# Patient Record
Sex: MALE | Race: WHITE | NOT HISPANIC OR LATINO | Employment: STUDENT | URBAN - METROPOLITAN AREA
[De-identification: names, ages, dates, MRNs, and addresses within clinical notes are randomized per-mention and may not be internally consistent; named-entity substitution may affect disease eponyms.]

---
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
## 2019-02-22 ENCOUNTER — OFFICE VISIT (OUTPATIENT)
Dept: PEDIATRICS | Facility: CLINIC | Age: 19
End: 2019-02-22
Payer: COMMERCIAL

## 2019-02-22 VITALS
HEIGHT: 78 IN | TEMPERATURE: 96.6 F | HEART RATE: 92 BPM | DIASTOLIC BLOOD PRESSURE: 80 MMHG | WEIGHT: 239.1 LBS | OXYGEN SATURATION: 100 % | BODY MASS INDEX: 27.66 KG/M2 | SYSTOLIC BLOOD PRESSURE: 120 MMHG

## 2019-02-22 DIAGNOSIS — S90.211A SUBUNGUAL HEMATOMA OF GREAT TOE OF RIGHT FOOT, INITIAL ENCOUNTER: Primary | ICD-10-CM

## 2019-02-22 DIAGNOSIS — L03.031 PARONYCHIA OF TOE, RIGHT: ICD-10-CM

## 2019-02-22 DIAGNOSIS — M79.674 GREAT TOE PAIN, RIGHT: ICD-10-CM

## 2019-02-22 PROCEDURE — 99213 OFFICE O/P EST LOW 20 MIN: CPT | Performed by: NURSE PRACTITIONER

## 2019-02-22 RX ORDER — CEPHALEXIN 500 MG/1
500 CAPSULE ORAL 2 TIMES DAILY
Qty: 14 CAPSULE | Refills: 0 | Status: SHIPPED | OUTPATIENT
Start: 2019-02-22 | End: 2019-03-01

## 2019-02-22 ASSESSMENT — MIFFLIN-ST. JEOR: SCORE: 2224.86

## 2019-02-22 NOTE — PATIENT INSTRUCTIONS
PLAN:   1.   Symptomatic therapy suggested: warm soaks  2.  Orders Placed This Encounter   Medications     cephALEXin (KEFLEX) 500 MG capsule     Sig: Take 1 capsule (500 mg) by mouth 2 times daily for 7 days     Dispense:  14 capsule     Refill:  0     3. Patient needs to follow up in if no improvement,or sooner if worsening of symptoms or other symptoms develop.  Patient Education     Subungual Hematoma    You just slammed the car door on your finger. The pain is nearly unbearable, and your nail has turned black and blue. It's likely you have a subungual hematoma. This is a pool of blood that collects under a nail after an injury. Although a nail hematoma is seldom serious, it can be very painful.  When to call your healthcare provider  Any severe blow to a finger or toe should be checked by your healthcare provider. You may have broken bones (fractures) or damage to other tissues. If you can't see your healthcare provider right away, go to the nearest emergency department.  Here's what you can expect when you see a healthcare provider:    Your nail will be examined.    X-rays may be taken to check for a bone fracture or other injury.    A procedure may be done to drain the blood from the hematoma and relieve pain (trephination). The healthcare provider makes a small hole in the nail using a special preston or drill. The blood under the nail can drain out through this hole. The nail is then bandaged.    If the nail is badly damaged it may need to be removed. Deep cuts beneath the nail can then be repaired with stitches.  Follow-up  If the damaged nail isn't removed, it will most likely fall off on its own. A fingernail can regrow in a few months. Toenails take longer--as long as a year and a half. See your healthcare provider if you have any problems with the nail as it heals and regrows.  Date Last Reviewed: 2/1/2018 2000-2018 [employer]. [address] [place] [address], [place] [address]. All rights  reserved. This information is not intended as a substitute for professional medical care. Always follow your healthcare professional's instructions.           Patient Education     Paronychia of the Finger or Toe  Paronychia is an infection near a fingernail or toenail. It usually occurs when an opening in the cuticle or an ingrown toenail lets bacteria under the skin.  The infection will need to be drained if pus is present. If the infection has been caught early, you may need only antibiotic treatment. Healing will take about 1 to 2 weeks.  Home care  Follow these guidelines when caring for yourself at home:    Clean and soak the toe or finger. Do this 2 times a day for the first 3 days. To do so:  ? Soak your foot or hand in a tub of warm water for 5 minutes. Or hold your toe or finger under a faucet of warm running water for 5 minutes.  ? Clean any crust away with soap and water using a cotton swab.  ? Put antibiotic ointment on the infected area.    Change the dressing daily or any time it gets dirty.    If you were given antibiotics, take them as directed until they are all gone.    If your infection is on a toe, wear comfortable shoes with a lot of toe room. You can also wear open-toed sandals while your toe heals.    You may use over-the-counter medicine (acetaminophen or ibuprofen to help with pain, unless another medicine was prescribed. If you have chronic liver or kidney disease, talk with your healthcare provider before using these medicines. Also talk with your provider if you've had a stomach ulcer or GI (gastrointestinal) bleeding.  Prevention  The following can prevent paronychia:    Avoid cutting or playing with your cuticles at home.    Don't bite your nails.    Don't suck on your thumbs or fingers.  Follow-up care  Follow up with your healthcare provider, or as advised.  When to seek medical advice  Call your healthcare provider right away if any of these occur:    Redness, pain, or swelling of  the finger or toe gets worse    Red streaks in the skin leading away from the wound    Pus or fluid draining from the nail area    Fever of 100.4 F (38 C) or higher, or as directed by your provider  Date Last Reviewed: 8/1/2016 2000-2018 [employer]. [address] [place] [address]. All rights reserved. This information is not intended as a substitute for professional medical care. Always follow your healthcare professional's instructions.

## 2019-02-22 NOTE — PROGRESS NOTES
SUBJECTIVE:   Lamine Cordero is a 18 year old male who presents to clinic today for the following health issues:      Right big toe Pain    Onset: 4-5 days    Description:   Location: right big toe  Character: sore feeling when pushing on nail    Intensity: mild    Progression of Symptoms: better    Accompanying Signs & Symptoms:  Other symptoms: swelling, redness and discoloration of toenail    History:   Previous similar pain: YES      Precipitating factors:   Trauma or overuse: YES- while snowboarding    Alleviating factors:  Improved by: nothing    Therapies Tried and outcome: elevation  Was snowboarding and jammed the toe and stung a little   The was blue under it right away   Was painful and now not so painful but feel like something under the nail     Problem list and histories reviewed & adjusted, as indicated.  Additional history: as documented    Patient Active Problem List   Diagnosis     Plantar warts     Nevus     Closed nondisplaced fracture of base of fifth metacarpal bone of right hand, initial encounter     Closed nondisplaced fracture of base of fourth metacarpal bone of right hand, initial encounter     Past Surgical History:   Procedure Laterality Date     NO HISTORY OF SURGERY         Social History     Tobacco Use     Smoking status: Never Smoker     Smokeless tobacco: Never Used     Tobacco comment: Lives in smoke free household   Substance Use Topics     Alcohol use: No     Family History   Problem Relation Age of Onset     Arthritis Mother      Cancer No family hx of      Diabetes No family hx of      Hypertension No family hx of      Cerebrovascular Disease No family hx of      Thyroid Disease No family hx of      Glaucoma No family hx of      Macular Degeneration No family hx of          No current outpatient medications on file.     No Known Allergies  BP Readings from Last 3 Encounters:   02/22/19 120/80   09/17/18 128/82   03/08/17 103/66    Wt Readings from Last 3 Encounters:  "  02/22/19 108.5 kg (239 lb 1.6 oz) (99 %)*   09/17/18 100.2 kg (221 lb) (97 %)*   03/08/17 88 kg (194 lb) (93 %)*     * Growth percentiles are based on [facility] (Boys, 2-20 Years) data.                    Reviewed and updated as needed this visit by clinical staff  Tobacco  Allergies  Meds  Med Hx  Surg Hx  Fam Hx  Soc Hx      Reviewed and updated as needed this visit by Provider         ROS:  Constitutional, HEENT, cardiovascular, pulmonary, gi and gu systems are negative, except as otherwise noted.    OBJECTIVE:     /80 (BP Location: Right arm, Patient Position: Sitting, Cuff Size: Adult Large)   Pulse 92   Temp 96.6  F (35.9  C) (Temporal)   Ht 1.969 m (6' 5.5\")   Wt 108.5 kg (239 lb 1.6 oz)   SpO2 100%   BMI 27.99 kg/m    Body mass index is 27.99 kg/m .  GENERAL APPEARANCE: alert, active and no distress  RESP: lungs clear to auscultation - no rales, rhonchi or wheezes  CV: regular rates and rhythm  MS: extremities normal- no gross deformities noted and peripheral pulses normal  ORTHO: Examination of the toe great toe shows nail exam subungal hematoma. Fluctuant area under nailfold with mild surrounding erythema. Not tender to palpation of the nail itself   SKIN: Skin color, texture, turgor normal.   PSYCH: mentation appears normal and affect normal/bright    Diagnostic Test Results:  none     ASSESSMENT/PLAN:     Lamine was seen today for musculoskeletal problem.    Diagnoses and all orders for this visit:    Subungual hematoma of great toe of right foot, initial encounter    Great toe pain, right    Paronychia of toe, right  -     cephALEXin (KEFLEX) 500 MG capsule; Take 1 capsule (500 mg) by mouth 2 times daily for 7 days    See Patient Instructions  Patient Instructions     PLAN:   1.   Symptomatic therapy suggested: warm soaks  2.  Orders Placed This Encounter   Medications     cephALEXin (KEFLEX) 500 MG capsule     Sig: Take 1 capsule (500 mg) by mouth 2 times daily for 7 days     Dispense:  14 " capsule     Refill:  0     3. Patient needs to follow up in if no improvement,or sooner if worsening of symptoms or other symptoms develop.  Patient Education     JANINE Alonso CNP  [facility]

## 2019-02-22 NOTE — NURSING NOTE
"Chief Complaint   Patient presents with     Musculoskeletal Problem     big toe on right foot injury snowboarding x 4-5 days        Initial /80 (BP Location: Right arm, Patient Position: Sitting, Cuff Size: Adult Large)   Pulse 92   Temp 96.6  F (35.9  C) (Temporal)   Ht 1.969 m (6' 5.5\")   Wt 108.5 kg (239 lb 1.6 oz)   SpO2 100%   BMI 27.99 kg/m   Estimated body mass index is 27.99 kg/m  as calculated from the following:    Height as of this encounter: 1.969 m (6' 5.5\").    Weight as of this encounter: 108.5 kg (239 lb 1.6 oz).  Medication Reconciliation: complete      CECIL Wen      "

## 2019-08-27 ENCOUNTER — ALLIED HEALTH/NURSE VISIT (OUTPATIENT)
Dept: NURSING | Facility: CLINIC | Age: 19
End: 2019-08-27
Payer: COMMERCIAL

## 2019-08-27 VITALS — TEMPERATURE: 97.1 F | WEIGHT: 250.8 LBS | BODY MASS INDEX: 29.36 KG/M2

## 2019-08-27 DIAGNOSIS — Z23 ENCOUNTER FOR VACCINATION: Primary | ICD-10-CM

## 2019-08-27 PROCEDURE — 90471 IMMUNIZATION ADMIN: CPT

## 2019-08-27 PROCEDURE — 90651 9VHPV VACCINE 2/3 DOSE IM: CPT

## 2019-08-27 PROCEDURE — 99207 ZZC NO CHARGE NURSE ONLY: CPT

## 2019-08-27 NOTE — PROGRESS NOTES
Screening Questionnaire for Adult Immunization    Are you sick today?   No   Do you have allergies to medications, food, a vaccine component or latex?   No   Have you ever had a serious reaction after receiving a vaccination?   No   Do you have a long-term health problem with heart disease, lung disease, asthma, kidney disease, metabolic disease (e.g. diabetes), anemia, or other blood disorder?   No   Do you have cancer, leukemia, HIV/AIDS, or any other immune system problem?   No   In the past 3 months, have you taken medications that affect  your immune system, such as prednisone, other steroids, or anticancer drugs; drugs for the treatment of rheumatoid arthritis, Crohn s disease, or psoriasis; or have you had radiation treatments?   No   Have you had a seizure, or a brain or other nervous system problem?   No   During the past year, have you received a transfusion of blood or blood     products, or been given immune (gamma) globulin or antiviral drug?   No   For women: Are you pregnant or is there a chance you could become        pregnant during the next month?   No   Have you received any vaccinations in the past 4 weeks?   No     Immunization questionnaire answers were all negative.      Gardasil 9/HPV #3 given in left deltoid by Jeanne Gomez. Patient instructed to remain in clinic for 15 minutes afterwards, and to report any adverse reaction to me immediately.       Screening performed by Jeanne Gomez on 8/27/2019 at 2:29 PM.

## 2020-01-14 ENCOUNTER — OFFICE VISIT (OUTPATIENT)
Dept: FAMILY MEDICINE | Facility: CLINIC | Age: 20
End: 2020-01-14
Payer: COMMERCIAL

## 2020-01-14 VITALS
DIASTOLIC BLOOD PRESSURE: 76 MMHG | TEMPERATURE: 98.5 F | HEIGHT: 77 IN | HEART RATE: 97 BPM | OXYGEN SATURATION: 98 % | WEIGHT: 251 LBS | SYSTOLIC BLOOD PRESSURE: 130 MMHG | BODY MASS INDEX: 29.64 KG/M2 | RESPIRATION RATE: 18 BRPM

## 2020-01-14 DIAGNOSIS — J06.9 VIRAL URI WITH COUGH: Primary | ICD-10-CM

## 2020-01-14 DIAGNOSIS — R07.0 THROAT PAIN: ICD-10-CM

## 2020-01-14 LAB
DEPRECATED S PYO AG THROAT QL EIA: NORMAL
SPECIMEN SOURCE: NORMAL

## 2020-01-14 PROCEDURE — 87880 STREP A ASSAY W/OPTIC: CPT | Performed by: FAMILY MEDICINE

## 2020-01-14 PROCEDURE — 87081 CULTURE SCREEN ONLY: CPT | Performed by: FAMILY MEDICINE

## 2020-01-14 PROCEDURE — 99213 OFFICE O/P EST LOW 20 MIN: CPT | Performed by: FAMILY MEDICINE

## 2020-01-14 RX ORDER — BENZONATATE 200 MG/1
200 CAPSULE ORAL 3 TIMES DAILY PRN
Qty: 30 CAPSULE | Refills: 0 | Status: SHIPPED | OUTPATIENT
Start: 2020-01-14 | End: 2022-06-08

## 2020-01-14 ASSESSMENT — MIFFLIN-ST. JEOR: SCORE: 2265.91

## 2020-01-14 NOTE — PATIENT INSTRUCTIONS

## 2020-01-14 NOTE — PROGRESS NOTES
Subjective     Lamine Cordero is a 19 year old male who presents to clinic today for the following health issues:    HPI   Acute Illness   Acute illness concerns: cough, sorethroat  Onset: x4-5 days     Fever: YES    Chills/Sweats: YES- both    Headache (location?): YES    Sinus Pressure:YES    Conjunctivitis:  no    Ear Pain: no    Rhinorrhea: no     Congestion: YES    Sore Throat: YES     Cough: YES    Wheeze: no    Decreased Appetite: no    Nausea: no    Vomiting: no    Diarrhea:  no    Dysuria/Freq.: no    Fatigue/Achiness: YES- body aches    Sick/Strep Exposure: no     Therapies Tried and outcome: aleve        Patient Active Problem List   Diagnosis     Plantar warts     Nevus     Closed nondisplaced fracture of base of fifth metacarpal bone of right hand, initial encounter     Closed nondisplaced fracture of base of fourth metacarpal bone of right hand, initial encounter     Past Surgical History:   Procedure Laterality Date     NO HISTORY OF SURGERY         Social History     Tobacco Use     Smoking status: Never Smoker     Smokeless tobacco: Never Used     Tobacco comment: Lives in smoke free household   Substance Use Topics     Alcohol use: No     Family History   Problem Relation Age of Onset     Arthritis Mother      Cancer No family hx of      Diabetes No family hx of      Hypertension No family hx of      Cerebrovascular Disease No family hx of      Thyroid Disease No family hx of      Glaucoma No family hx of      Macular Degeneration No family hx of          Current Outpatient Medications   Medication Sig Dispense Refill     benzonatate (TESSALON) 200 MG capsule Take 1 capsule (200 mg) by mouth 3 times daily as needed for cough 30 capsule 0     No Known Allergies      Reviewed and updated as needed this visit by Provider         Review of Systems   ROS COMP: Constitutional, HEENT, cardiovascular, pulmonary, gi and gu systems are negative, except as otherwise noted.      Objective    /76   Pulse  "97   Temp 98.5  F (36.9  C) (Tympanic)   Resp 18   Ht 1.956 m (6' 5\")   Wt 113.9 kg (251 lb)   SpO2 98%   BMI 29.76 kg/m    Body mass index is 29.76 kg/m .  Physical Exam   GENERAL: healthy, alert and no distress  EYES: Eyes grossly normal to inspection, PERRL and conjunctivae and sclerae normal  HENT: ear canals and TM's normal, nose and mouth without ulcers or lesions  NECK: no adenopathy, no asymmetry, masses, or scars and thyroid normal to palpation  RESP: lungs clear to auscultation - no rales, rhonchi or wheezes  CV: regular rate and rhythm, normal S1 S2, no S3 or S4, no murmur, click or rub, no peripheral edema and peripheral pulses strong  PSYCH: mentation appears normal, affect normal/bright    Diagnostic Test Results:  Reviewed and discussed with patient prior to discharge.  Results for orders placed or performed in visit on 01/14/20   Strep, Rapid Screen     Status: None   Result Value Ref Range    Specimen Description Throat     Rapid Strep A Screen       NEGATIVE: No Group A streptococcal antigen detected by immunoassay, await culture report.             Assessment & Plan     Lamine was seen today for pharyngitis.    Diagnoses and all orders for this visit:    Viral URI with cough  -     benzonatate (TESSALON) 200 MG capsule; Take 1 capsule (200 mg) by mouth 3 times daily as needed for cough  -     Reassured that this is self limiting.   Recommended supportive management. Increase fluid intake. Plenty of rest.   Tylenol+/-Ibuprofen as needed for discomfort and fever.      Throat pain  -     Strep, Rapid Screen  -     Beta strep group A culture  -     Throat lozenges or sprays (such as Chloraseptic) help reduce pain. Gargling with warm salt water will also reduce throat pain. Dissolve 1/2 teaspoon of salt in 1 glass of warm water. This may be useful just before meals.         Patient education and Handout with home care instructions given. See AVS for details.      Return in about 1 week (around " 1/21/2020), or if symptoms worsen or fail to improve, for a Physical Exam at your earliest convenience..    Blanche Davies MD  [facility]

## 2020-01-15 LAB
BACTERIA SPEC CULT: NORMAL
SPECIMEN SOURCE: NORMAL

## 2022-06-08 ENCOUNTER — OFFICE VISIT (OUTPATIENT)
Dept: FAMILY MEDICINE | Facility: CLINIC | Age: 22
End: 2022-06-08
Payer: COMMERCIAL

## 2022-06-08 VITALS
OXYGEN SATURATION: 99 % | TEMPERATURE: 97.5 F | BODY MASS INDEX: 30.7 KG/M2 | HEART RATE: 89 BPM | WEIGHT: 260 LBS | SYSTOLIC BLOOD PRESSURE: 114 MMHG | RESPIRATION RATE: 12 BRPM | HEIGHT: 77 IN | DIASTOLIC BLOOD PRESSURE: 80 MMHG

## 2022-06-08 DIAGNOSIS — J02.0 STREPTOCOCCAL PHARYNGITIS: Primary | ICD-10-CM

## 2022-06-08 DIAGNOSIS — R07.0 THROAT PAIN: ICD-10-CM

## 2022-06-08 DIAGNOSIS — Z23 NEED FOR TDAP VACCINATION: ICD-10-CM

## 2022-06-08 LAB
DEPRECATED S PYO AG THROAT QL EIA: POSITIVE
FLUAV AG SPEC QL IA: NEGATIVE
FLUBV AG SPEC QL IA: NEGATIVE

## 2022-06-08 PROCEDURE — U0005 INFEC AGEN DETEC AMPLI PROBE: HCPCS | Performed by: NURSE PRACTITIONER

## 2022-06-08 PROCEDURE — 87804 INFLUENZA ASSAY W/OPTIC: CPT | Performed by: NURSE PRACTITIONER

## 2022-06-08 PROCEDURE — 90715 TDAP VACCINE 7 YRS/> IM: CPT | Performed by: NURSE PRACTITIONER

## 2022-06-08 PROCEDURE — 90471 IMMUNIZATION ADMIN: CPT | Performed by: NURSE PRACTITIONER

## 2022-06-08 PROCEDURE — 87880 STREP A ASSAY W/OPTIC: CPT | Performed by: NURSE PRACTITIONER

## 2022-06-08 PROCEDURE — 99214 OFFICE O/P EST MOD 30 MIN: CPT | Mod: CS | Performed by: NURSE PRACTITIONER

## 2022-06-08 PROCEDURE — U0003 INFECTIOUS AGENT DETECTION BY NUCLEIC ACID (DNA OR RNA); SEVERE ACUTE RESPIRATORY SYNDROME CORONAVIRUS 2 (SARS-COV-2) (CORONAVIRUS DISEASE [COVID-19]), AMPLIFIED PROBE TECHNIQUE, MAKING USE OF HIGH THROUGHPUT TECHNOLOGIES AS DESCRIBED BY CMS-2020-01-R: HCPCS | Performed by: NURSE PRACTITIONER

## 2022-06-08 RX ORDER — AMOXICILLIN 500 MG/1
500 CAPSULE ORAL 2 TIMES DAILY
Qty: 20 CAPSULE | Refills: 0 | Status: SHIPPED | OUTPATIENT
Start: 2022-06-08 | End: 2022-06-18

## 2022-06-08 ASSESSMENT — PAIN SCALES - GENERAL: PAINLEVEL: SEVERE PAIN (7)

## 2022-06-08 NOTE — PROGRESS NOTES
"Assessment & Plan   1. Streptococcal pharyngitis: strep antigen positive. Amoxicillin prescribed. Discussed options for pain and inflammation control by alternating tylenol and ibuprofen every 6-8 hours. Provided education on when to go to the ER, such as inability to control oral secretion and SOB/difficulty breathing. Patient is agreeable to plan and understands follow-up.   - amoxicillin (AMOXIL) 500 MG capsule; Take 1 capsule (500 mg) by mouth 2 times daily for 10 days  Dispense: 20 capsule; Refill: 0    2. Throat pain: strep positive  - Streptococcus A Rapid Screen w/Reflex to PCR  - Influenza A & B Antigen  - Symptomatic; Unknown COVID-19 Virus (Coronavirus) by PCR Nose    3. Need for Tdap vaccination: TDAP updated at appointment  - TDAP VACCINE (Adacel, Boostrix)    See Patient Instructions    JANINE Driver CNP  [facility] RASHI Raman is a 21 year old who presents for the following health issues     History of Present Illness       Reason for visit:  Issues with tonsil area in throat  Symptom onset:  Today  Symptom intensity:  Moderate  Symptom progression:  Staying the same  Had these symptoms before:  No    He eats 2-3 servings of fruits and vegetables daily.He consumes 2 sweetened beverage(s) daily.He exercises with enough effort to increase his heart rate 20 to 29 minutes per day.  He exercises with enough effort to increase his heart rate 4 days per week.   He is taking medications regularly.    Review of Systems   CONSTITUTIONAL: NEGATIVE for fever, chills, change in weight  ENT/MOUTH: POSITIVE for sore throat  RESP: NEGATIVE for significant cough or SOB  CV: NEGATIVE for chest pain, palpitations or peripheral edema  ROS otherwise negative      Objective    /80 (BP Location: Left arm, Patient Position: Sitting, Cuff Size: Adult Large)   Pulse 89   Temp 97.5  F (36.4  C) (Tympanic)   Resp 12   Ht 1.956 m (6' 5\")   Wt 117.9 kg (260 lb)   SpO2 99%   BMI " 30.83 kg/m      Physical Exam  Constitutional:       General: He is not in acute distress.     Appearance: Normal appearance. He is not ill-appearing.   HENT:      Head: Normocephalic and atraumatic.      Right Ear: Tympanic membrane and ear canal normal.      Left Ear: Tympanic membrane and ear canal normal.      Nose: Congestion present.      Mouth/Throat:      Pharynx: Pharyngeal swelling and posterior oropharyngeal erythema present.      Tonsils: Tonsillar exudate present.   Cardiovascular:      Rate and Rhythm: Normal rate and regular rhythm.      Heart sounds: Normal heart sounds, S1 normal and S2 normal. Heart sounds not distant. No murmur heard.    No friction rub. No gallop. No S3 or S4 sounds.   Pulmonary:      Effort: Pulmonary effort is normal.      Breath sounds: Normal breath sounds. No decreased breath sounds, wheezing, rhonchi or rales.   Musculoskeletal:      Cervical back: Full passive range of motion without pain, normal range of motion and neck supple.   Lymphadenopathy:      Head:      Right side of head: No submental, submandibular, tonsillar or posterior auricular adenopathy.      Left side of head: No submental, submandibular, tonsillar or posterior auricular adenopathy.      Cervical: No cervical adenopathy.   Skin:     General: Skin is warm and dry.      Capillary Refill: Capillary refill takes 2 to 3 seconds.   Neurological:      Mental Status: He is alert.        Results for orders placed or performed in visit on 06/08/22 (from the past 24 hour(s))   Streptococcus A Rapid Screen w/Reflex to PCR    Specimen: Throat; Swab   Result Value Ref Range    Group A Strep antigen Positive (A) Negative   Influenza A & B Antigen    Specimen: Nose; Swab   Result Value Ref Range    Influenza A antigen Negative Negative    Influenza B antigen Negative Negative    Narrative    Test results must be correlated with clinical data. If necessary, results should be confirmed by a molecular assay or viral  culture.         I agree with the PFSH and ROS as completed by the NP student. The remainder of the encounter was performed by me and scribed the NP student. The scribed note accurately reflects my personal services and the decisions made by me. JANINE Hidalgo, NP-C

## 2022-06-08 NOTE — LETTER
[facility]  [address] [place] [address]  Phone: [phone]    June 8, 2022        Lamine Cordero  [address] [address]      RE: Lamine Cordero    Patient was seen and treated today at our clinic.  Please excuse him from work today, June 8, 2022    Please contact me for questions or concerns.      Sincerely,        JANINE Driver CNP

## 2022-06-09 LAB — SARS-COV-2 RNA RESP QL NAA+PROBE: NEGATIVE

## 2022-06-19 ENCOUNTER — HEALTH MAINTENANCE LETTER (OUTPATIENT)
Age: 22
End: 2022-06-19

## 2022-07-05 NOTE — PROGRESS NOTES
SUBJECTIVE:   CC: Lamine Cordero is an 21 year old woman who presents for preventive health visit.     {Split Bill scripting  The purpose of this visit is to discuss your medical history and prevent health problems before you are sick. You may be responsible for a co-pay, coinsurance, or deductible if your visit today includes services such as checking on a sore throat, having an x-ray or lab test, or treating and evaluating a new or existing condition :236789}  {Patient advised of split billing (Optional):684143}  HPI  {Add if <65 person on Medicare  - Required Questions (Optional):136561}  {Outside tests to abstract? :233943}    {additional problems to add (Optional):627773}    Today's PHQ-2 Score:   PHQ-2 ( 1999 [employer]) 6/8/2022   Q1: Little interest or pleasure in doing things 1   Q2: Feeling down, depressed or hopeless 1   PHQ-2 Score 2   PHQ-2 Total Score (12-17 Years)- Positive if 3 or more points; Administer PHQ-A if positive -   Q1: Little interest or pleasure in doing things Several days   Q2: Feeling down, depressed or hopeless Several days   PHQ-2 Score 2       Abuse: Current or Past (Physical, Sexual or Emotional) - { :656079}  Do you feel safe in your environment? { :890218}    Have you ever done Advance Care Planning? (For example, a Health Directive, POLST, or a discussion with a medical provider or your loved ones about your wishes): { :535538}    Social History     Tobacco Use     Smoking status: Never Smoker     Smokeless tobacco: Never Used     Tobacco comment: Lives in smoke free household   Substance Use Topics     Alcohol use: No     {Rooming Staff- Complete this question if Prescreen response is not shown below for today's visit. If you drink alcohol do you typically have >3 drinks per day or >7 drinks per week? (Optional):958335}    No flowsheet data found.{add AUDIT responses (Optional) (A score of 7 for adult men is an indication of hazardous drinking; a score of 8 or more is an  "indication of an alcohol use disorder.  A score of 7 or more for adult women is an indication of hazardous drinking or an alchohol use disorder):885083}    Reviewed orders with patient.  Reviewed health maintenance and updated orders accordingly - { :395638::\"Yes\"}  {Chronicprobdata (optional):026470}    Breast Cancer Screening:        History of abnormal Pap smear: { :668654}     Reviewed and updated as needed this visit by clinical staff                    Reviewed and updated as needed this visit by Provider                   {HISTORY OPTIONS (Optional):611389}    Review of Systems  {FEMALE ROS (Optional):763570}     OBJECTIVE:   There were no vitals taken for this visit.  Physical Exam  {Exam Choices (Optional):171776}    {Diagnostic Test Results (Optional):104507::\"Diagnostic Test Results:\",\"Labs reviewed in Epic\"}    ASSESSMENT/PLAN:   {Diag Picklist:731820}    {Patient advised of split billing (Optional):124401}    COUNSELING:  {FEMALE COUNSELING MESSAGES:882910::\"Reviewed preventive health counseling, as reflected in patient instructions\"}    Estimated body mass index is 30.83 kg/m  as calculated from the following:    Height as of 6/8/22: 1.956 m (6' 5\").    Weight as of 6/8/22: 117.9 kg (260 lb).    {Weight Management Plan (ACO) Complete if BMI is abnormal-  Ages 18-64  BMI >24.9.  Age 65+ with BMI <23 or >30 (Optional):998304}    He reports that he has never smoked. He has never used smokeless tobacco.      Counseling Resources:  ATP IV Guidelines  Pooled Cohorts Equation Calculator  Breast Cancer Risk Calculator  BRCA-Related Cancer Risk Assessment: FHS-7 Tool  FRAX Risk Assessment  ICSI Preventive Guidelines  Dietary Guidelines for Americans, 2010  USDA's MyPlate  ASA Prophylaxis  Lung CA Screening    JANINE Driver [facility]  "

## 2022-07-06 ENCOUNTER — OFFICE VISIT (OUTPATIENT)
Dept: FAMILY MEDICINE | Facility: CLINIC | Age: 22
End: 2022-07-06
Payer: COMMERCIAL

## 2022-07-06 VITALS
TEMPERATURE: 97.8 F | HEIGHT: 78 IN | BODY MASS INDEX: 32.6 KG/M2 | OXYGEN SATURATION: 100 % | SYSTOLIC BLOOD PRESSURE: 117 MMHG | WEIGHT: 281.8 LBS | DIASTOLIC BLOOD PRESSURE: 79 MMHG | HEART RATE: 77 BPM | RESPIRATION RATE: 20 BRPM

## 2022-07-06 DIAGNOSIS — R09.81 NASAL CONGESTION: ICD-10-CM

## 2022-07-06 DIAGNOSIS — Z11.59 NEED FOR HEPATITIS C SCREENING TEST: ICD-10-CM

## 2022-07-06 DIAGNOSIS — B37.2 CANDIDAL INTERTRIGO: Primary | ICD-10-CM

## 2022-07-06 DIAGNOSIS — F41.9 ANXIETY: ICD-10-CM

## 2022-07-06 DIAGNOSIS — Z00.00 ROUTINE GENERAL MEDICAL EXAMINATION AT A HEALTH CARE FACILITY: ICD-10-CM

## 2022-07-06 LAB
CHOLEST SERPL-MCNC: 155 MG/DL
DEPRECATED CALCIDIOL+CALCIFEROL SERPL-MC: 19 UG/L (ref 20–75)
FASTING STATUS PATIENT QL REPORTED: YES
FASTING STATUS PATIENT QL REPORTED: YES
GLUCOSE BLD-MCNC: 95 MG/DL (ref 70–99)
HCV AB SERPL QL IA: NONREACTIVE
HDLC SERPL-MCNC: 40 MG/DL
LDLC SERPL CALC-MCNC: 92 MG/DL
NONHDLC SERPL-MCNC: 115 MG/DL
TRIGL SERPL-MCNC: 114 MG/DL
TSH SERPL DL<=0.005 MIU/L-ACNC: 1.38 MU/L (ref 0.4–4)

## 2022-07-06 PROCEDURE — 80061 LIPID PANEL: CPT | Performed by: NURSE PRACTITIONER

## 2022-07-06 PROCEDURE — 0052A COVID-19,PF,PFIZER (12+ YRS): CPT | Performed by: NURSE PRACTITIONER

## 2022-07-06 PROCEDURE — 86803 HEPATITIS C AB TEST: CPT | Performed by: NURSE PRACTITIONER

## 2022-07-06 PROCEDURE — 99213 OFFICE O/P EST LOW 20 MIN: CPT | Mod: 25 | Performed by: NURSE PRACTITIONER

## 2022-07-06 PROCEDURE — 82947 ASSAY GLUCOSE BLOOD QUANT: CPT | Performed by: NURSE PRACTITIONER

## 2022-07-06 PROCEDURE — 99395 PREV VISIT EST AGE 18-39: CPT | Mod: 25 | Performed by: NURSE PRACTITIONER

## 2022-07-06 PROCEDURE — 36415 COLL VENOUS BLD VENIPUNCTURE: CPT | Performed by: NURSE PRACTITIONER

## 2022-07-06 PROCEDURE — 84443 ASSAY THYROID STIM HORMONE: CPT | Performed by: NURSE PRACTITIONER

## 2022-07-06 PROCEDURE — 91305 COVID-19,PF,PFIZER (12+ YRS): CPT | Performed by: NURSE PRACTITIONER

## 2022-07-06 PROCEDURE — 82306 VITAMIN D 25 HYDROXY: CPT | Performed by: NURSE PRACTITIONER

## 2022-07-06 RX ORDER — NYSTATIN 100000 U/G
CREAM TOPICAL
Qty: 30 G | Refills: 1 | Status: SHIPPED | OUTPATIENT
Start: 2022-07-06

## 2022-07-06 ASSESSMENT — PAIN SCALES - GENERAL: PAINLEVEL: SEVERE PAIN (7)

## 2022-07-06 ASSESSMENT — ENCOUNTER SYMPTOMS
FREQUENCY: 0
PALPITATIONS: 0
SORE THROAT: 0
HEADACHES: 0
EYE DISCHARGE: 0
JOINT SWELLING: 0
FATIGUE: 0
WEAKNESS: 0
CONSTIPATION: 0
BRUISES/BLEEDS EASILY: 0
SINUS PRESSURE: 0
COUGH: 0
MYALGIAS: 0
EYE PAIN: 0
HEARTBURN: 0
ABDOMINAL PAIN: 0
DYSURIA: 0
WHEEZING: 0
NERVOUS/ANXIOUS: 1
ARTHRALGIAS: 1
DIARRHEA: 0
SHORTNESS OF BREATH: 1
CHEST TIGHTNESS: 0
HEMATURIA: 0
VOMITING: 0
DIAPHORESIS: 0
ARTHRALGIAS: 0
NAUSEA: 0
RHINORRHEA: 0
FEVER: 0
HEMATOCHEZIA: 0
CONFUSION: 0
DIZZINESS: 0
PARESTHESIAS: 0
LIGHT-HEADEDNESS: 0
CHILLS: 0
NUMBNESS: 0

## 2022-07-06 NOTE — PROGRESS NOTES
SUBJECTIVE:   CC: Lamine Cordero is an 21 year old male who presents for preventative health visit.       Patient has been advised of split billing requirements and indicates understanding: Yes  Healthy Habits:     Getting at least 3 servings of Calcium per day:  Yes    Bi-annual eye exam:  Yes    Dental care twice a year:  Yes    Sleep apnea or symptoms of sleep apnea:  Daytime drowsiness    Diet:  Regular (no restrictions)    Frequency of exercise:  2-3 days/week    Duration of exercise:  30-45 minutes    Taking medications regularly:  Not Applicable    Medication side effects:  Not applicable    PHQ-2 Total Score: 2    Additional concerns today:  Yes      Today's PHQ-2 Score:   PHQ-2 ( 1999 Pfizer) 7/6/2022   Q1: Little interest or pleasure in doing things 1   Q2: Feeling down, depressed or hopeless 1   PHQ-2 Score 2   PHQ-2 Total Score (12-17 Years)- Positive if 3 or more points; Administer PHQ-A if positive -   Q1: Little interest or pleasure in doing things Several days   Q2: Feeling down, depressed or hopeless Several days   PHQ-2 Score 2       Abuse: Current or Past(Physical, Sexual or Emotional)- No  Do you feel safe in your environment? Yes    Have you ever done Advance Care Planning? (For example, a Health Directive, POLST, or a discussion with a medical provider or your loved ones about your wishes): No, advance care planning information given to patient to review.  Patient declined advance care planning discussion at this time.    Social History     Tobacco Use     Smoking status: Never Smoker     Smokeless tobacco: Never Used     Tobacco comment: Lives in smoke free household   Substance Use Topics     Alcohol use: Yes     Comment: socially         Alcohol Use 7/6/2022   Prescreen: >3 drinks/day or >7 drinks/week? No       Last PSA: No results found for: PSA    Reviewed orders with patient. Reviewed health maintenance and updated orders accordingly - Yes  Current Outpatient Medications   Medication Sig  Dispense Refill     nystatin (MYCOSTATIN) 742794 UNIT/GM external cream Apply to affected area 2-3 times per day until rash is resolved. 30 g 1     No Known Allergies    Reviewed and updated as needed this visit by clinical staff   Tobacco  Allergies  Meds  Problems  Med Hx  Surg Hx  Fam Hx  Soc   Hx          Reviewed and updated as needed this visit by Provider     Meds  Problems                Not able to sleep well. Will toss and turn in bed. Nearly every day this happens. Has been told that stops breathing with is sleeping. Nose in congested and feels like cant get a full breath out of nose. Felt this way for some time. Snores a lot. Did try over the counter nasal spray and that really did not help-thinks was nasonex.     Has a rash to left groin. Has been there for the last 2-3 weeks. Has been using some cortisone cream and that did not help. Has been using gold bond powder and that helps while is at work, but it will itch.     Feels anxious-is always on edge. Not fight or flight. 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 Thinks this could be part of the reason that cant fall asleep at night. Does get short of breath at times. No fluttering in chest. Does get sweaty at times. Has not been to cousling.     Last PSA: Never, no family history  Last Colonoscopy: Never, no family history   Last eye exam: Plans to go yearly   Last dental exam: every 6 mo  Last tetanus vaccine: 6/22  Last influenza vaccine: did not have.   Last shingles vaccine: Not applicable  Last pneumonia vaccine: Not applicable   Last COVID vaccine: Has had first dose, will administer second dose today in clinic  Last COVID booster: Not applicable  Hep C screen: We will check here today  HIV screen: Declines  AAA screen (age 65-75 with smoking hx): Not applicable  IVD (HTN, Hyperlipid, Smoking): Not applicable  Lung CA screening (50-80, 20 pk smoking hx) Quit within 15 years: Not  "applicable    www.shouldiscreen.com.      Review of Systems   Constitutional: Negative for chills, diaphoresis, fatigue and fever.   HENT: Positive for congestion. Negative for ear pain, hearing loss, postnasal drip, rhinorrhea, sinus pressure and sore throat.    Eyes: Negative for pain, discharge and visual disturbance.   Respiratory: Positive for shortness of breath. Negative for cough, chest tightness and wheezing.    Cardiovascular: Negative for chest pain, palpitations and peripheral edema.   Gastrointestinal: Negative for abdominal pain, constipation, diarrhea, heartburn, hematochezia, nausea and vomiting.   Genitourinary: Negative for dysuria, frequency, genital sores, hematuria, impotence, penile discharge and urgency.   Musculoskeletal: Negative for arthralgias, joint swelling and myalgias.   Skin: Positive for rash.   Neurological: Negative for dizziness, weakness, light-headedness, numbness, headaches and paresthesias.   Hematological: Does not bruise/bleed easily.   Psychiatric/Behavioral: Negative for confusion and mood changes. The patient is nervous/anxious.        OBJECTIVE:   /79   Pulse 77   Temp 97.8  F (36.6  C) (Tympanic)   Resp 20   Ht 2 m (6' 6.75\")   Wt 127.8 kg (281 lb 12.8 oz)   SpO2 100%   BMI 31.95 kg/m      Physical Exam  Constitutional:       Appearance: He is well-developed.   HENT:      Right Ear: Tympanic membrane and external ear normal. No middle ear effusion. Tympanic membrane is not erythematous.      Left Ear: Tympanic membrane and external ear normal.  No middle ear effusion. Tympanic membrane is not erythematous.      Nose:      Right Turbinates: Enlarged.      Mouth/Throat:      Pharynx: Uvula midline.   Eyes:      Pupils: Pupils are equal, round, and reactive to light.   Neck:      Thyroid: No thyromegaly.      Vascular: No carotid bruit.   Cardiovascular:      Rate and Rhythm: Normal rate and regular rhythm.      Pulses:           Femoral pulses are 2+ on the " right side and 2+ on the left side.     Heart sounds: Normal heart sounds.   Pulmonary:      Effort: Pulmonary effort is normal.      Breath sounds: Normal breath sounds.   Abdominal:      General: Bowel sounds are normal. There is no distension.      Palpations: Abdomen is soft.      Tenderness: There is no abdominal tenderness.   Musculoskeletal:         General: Normal range of motion.   Skin:     General: Skin is warm and dry.          Neurological:      Mental Status: He is alert.         ASSESSMENT/PLAN:   Need for hepatitis C screening test  - Hepatitis C Screen Reflex to HCV RNA Quant and Genotype; Future  - Hepatitis C Screen Reflex to HCV RNA Quant and Genotype    Routine general medical examination at a health care facility  Screening guidelines reviewed.   - Lipid panel reflex to direct LDL Fasting; Future  - Glucose; Future  - Glucose  - Lipid panel reflex to direct LDL Fasting    Candidal intertrigo  Encouraged to keep area clean and dry  - nystatin (MYCOSTATIN) [id number] UNIT/GM external cream; Apply to affected area 2-3 times per day until rash is resolved.    Anxiety  We will check basic labs here today.  Referral placed for counseling/therapy.  - Adult Mental Health  Referral; Future  - Vitamin D Deficiency; Future  - TSH with free T4 reflex; Future  - TSH with free T4 reflex  - Vitamin D Deficiency    Nasal congestion  Will refer to ENT for further evaluation.  - Adult ENT  Referral; Future    Patient has been advised of split billing requirements and indicates understanding: Yes    COUNSELING:   Reviewed preventive health counseling, as reflected in patient instructions       Regular exercise       Healthy diet/nutrition       Vision screening       Immunizations    Vaccinated for: COVID             Safe sex practices/STD prevention       Consider Hep C screening for all patients one time for ages 18-79 years       HIV screeninx in teen years, 1x in adult years, and at  "intervals if high risk    Estimated body mass index is 31.95 kg/m  as calculated from the following:    Height as of this encounter: 2 m (6' 6.75\").    Weight as of this encounter: 127.8 kg (281 lb 12.8 oz).     Weight management plan: Discussed healthy diet and exercise guidelines    He reports that he has never smoked. He has never used smokeless tobacco.      Counseling Resources:  ATP IV Guidelines  Pooled Cohorts Equation Calculator  FRAX Risk Assessment  ICSI Preventive Guidelines  Dietary Guidelines for Americans, 2010  USDA's MyPlate  ASA Prophylaxis  Lung CA Screening    JANINE Driver CNP  [facility]  "

## 2022-08-26 ENCOUNTER — OFFICE VISIT (OUTPATIENT)
Dept: OTOLARYNGOLOGY | Facility: CLINIC | Age: 22
End: 2022-08-26
Attending: NURSE PRACTITIONER
Payer: COMMERCIAL

## 2022-08-26 VITALS — SYSTOLIC BLOOD PRESSURE: 121 MMHG | HEART RATE: 83 BPM | DIASTOLIC BLOOD PRESSURE: 80 MMHG

## 2022-08-26 DIAGNOSIS — G47.33 OSA (OBSTRUCTIVE SLEEP APNEA): Primary | ICD-10-CM

## 2022-08-26 DIAGNOSIS — R09.81 NASAL CONGESTION: ICD-10-CM

## 2022-08-26 DIAGNOSIS — J34.2 NASAL SEPTAL DEVIATION: ICD-10-CM

## 2022-08-26 DIAGNOSIS — J34.3 NASAL TURBINATE HYPERTROPHY: ICD-10-CM

## 2022-08-26 PROCEDURE — 99203 OFFICE O/P NEW LOW 30 MIN: CPT | Performed by: OTOLARYNGOLOGY

## 2022-08-26 ASSESSMENT — ENCOUNTER SYMPTOMS
COUGH: 0
PHOTOPHOBIA: 0
STRIDOR: 0
HEADACHES: 0
HEARTBURN: 0
HEMOPTYSIS: 0
TINGLING: 0
DIZZINESS: 0
TREMORS: 0
BLURRED VISION: 0
SORE THROAT: 0
SHORTNESS OF BREATH: 0
CONSTITUTIONAL NEGATIVE: 1
SPUTUM PRODUCTION: 0
NAUSEA: 0
VOMITING: 0
BRUISES/BLEEDS EASILY: 0
DOUBLE VISION: 0
SINUS PAIN: 0

## 2022-08-26 NOTE — PROGRESS NOTES
Chief Complaint   Patient presents with     Consult     Nasal congestion for approx 20 years. Worse on the right side and constant.       Nasal Obstruction Symptom Evaluation (NOSE QUESTIONNAIRE):     3 - a fairly bad problem: Nasal congestion or stuffiness?  2 - a moderate problem: Nasal blockage or obstruction?  4 - a severe problem: Trouble breathing through his nose?  4 - a severe problem: Trouble sleeping?  3 - a fairly bad problem: Inability to get enough air through his nose during exercise or exertion?    Add up the NOSE Score:  16 = Total NOSE score

## 2022-08-26 NOTE — PROGRESS NOTES
HPI    This pleasant patient is having nasal congestion for months. Describes snoring, nasal congestion  That alternates and a possible apnea. States that he feels tired in the morning. Denies any morning headache, runny nose, sneezing, coughing, environmental allergies or facial pressure.    Review of Systems   Constitutional: Negative.    HENT: Positive for congestion and nosebleeds. Negative for ear discharge, ear pain, hearing loss, sinus pain, sore throat and tinnitus.    Eyes: Negative for blurred vision, double vision and photophobia.   Respiratory: Negative for cough, hemoptysis, sputum production, shortness of breath and stridor.    Gastrointestinal: Negative for heartburn, nausea and vomiting.   Skin: Negative.    Neurological: Negative for dizziness, tingling, tremors and headaches.   Endo/Heme/Allergies: Negative for environmental allergies. Does not bruise/bleed easily.         Physical Exam  Vitals reviewed.   Constitutional:       Appearance: Normal appearance.   HENT:      Head: Normocephalic and atraumatic.      Right Ear: Tympanic membrane, ear canal and external ear normal. No middle ear effusion. There is no impacted cerumen.      Left Ear: Tympanic membrane, ear canal and external ear normal.  No middle ear effusion. There is no impacted cerumen.      Nose: Septal deviation and congestion present. No rhinorrhea.      Right Turbinates: Swollen.      Left Turbinates: Swollen.      Mouth/Throat:      Mouth: Mucous membranes are moist.      Pharynx: Oropharynx is clear. Uvula midline.      Tonsils: 3+ on the right. 3+ on the left.   Eyes:      Extraocular Movements: Extraocular movements intact.      Pupils: Pupils are equal, round, and reactive to light.   Neurological:      Mental Status: He is alert.       A/P  This pleasant patient is having septal deviation, inferior turbinate hypertrophy, and possible obstructive sleep apnea. Options were discussed. I will refer him to sleep medicine and give  him a topical nasal steroid spray once a day for 6 weeks and f/u as scheduled.

## 2022-08-26 NOTE — LETTER
8/26/2022         RE: Lamine Cordero  [address]  [place] [address]        Dear Colleague,    Thank you for referring your patient, Lamine Cordero, to the [facility]. Please see a copy of my visit note below.    HPI    This pleasant patient is having nasal congestion for months. Describes snoring, nasal congestion  That alternates and a possible apnea. States that he feels tired in the morning. Denies any morning headache, runny nose, sneezing, coughing, environmental allergies or facial pressure.    Review of Systems   Constitutional: Negative.    HENT: Positive for congestion and nosebleeds. Negative for ear discharge, ear pain, hearing loss, sinus pain, sore throat and tinnitus.    Eyes: Negative for blurred vision, double vision and photophobia.   Respiratory: Negative for cough, hemoptysis, sputum production, shortness of breath and stridor.    Gastrointestinal: Negative for heartburn, nausea and vomiting.   Skin: Negative.    Neurological: Negative for dizziness, tingling, tremors and headaches.   Endo/Heme/Allergies: Negative for environmental allergies. Does not bruise/bleed easily.         Physical Exam  Vitals reviewed.   Constitutional:       Appearance: Normal appearance.   HENT:      Head: Normocephalic and atraumatic.      Right Ear: Tympanic membrane, ear canal and external ear normal. No middle ear effusion. There is no impacted cerumen.      Left Ear: Tympanic membrane, ear canal and external ear normal.  No middle ear effusion. There is no impacted cerumen.      Nose: Septal deviation and congestion present. No rhinorrhea.      Right Turbinates: Swollen.      Left Turbinates: Swollen.      Mouth/Throat:      Mouth: Mucous membranes are moist.      Pharynx: Oropharynx is clear. Uvula midline.      Tonsils: 3+ on the right. 3+ on the left.   Eyes:      Extraocular Movements: Extraocular movements intact.      Pupils: Pupils are equal, round, and reactive to  light.   Neurological:      Mental Status: He is alert.       A/P  This pleasant patient is having septal deviation, inferior turbinate hypertrophy, and possible obstructive sleep apnea. Options were discussed. I will refer him to sleep medicine and give him a topical nasal steroid spray once a day for 6 weeks and f/u as scheduled.      Chief Complaint   Patient presents with     Consult     Nasal congestion for approx 20 years. Worse on the right side and constant.       Nasal Obstruction Symptom Evaluation (NOSE QUESTIONNAIRE):     3 - a fairly bad problem: Nasal congestion or stuffiness?  2 - a moderate problem: Nasal blockage or obstruction?  4 - a severe problem: Trouble breathing through his nose?  4 - a severe problem: Trouble sleeping?  3 - a fairly bad problem: Inability to get enough air through his nose during exercise or exertion?    Add up the NOSE Score:  16 = Total NOSE score        Again, thank you for allowing me to participate in the care of your patient.        Sincerely,        Pao Sy MD

## 2022-08-26 NOTE — NURSING NOTE
Lamine Cordero's chief complaint for this visit includes:  Chief Complaint   Patient presents with     Consult     Nasal congestion for approx 20 years. Worse on the right side and constant.       PCP: Homer Guerrero, [place]    Referring Provider:  JANINE Cornell CNP  [address] [address] [place] [address]    /80   Pulse 83   Data Unavailable      No Known Allergies      Do you need any medication refills at today's visit?

## 2022-10-07 ENCOUNTER — OFFICE VISIT (OUTPATIENT)
Dept: OTOLARYNGOLOGY | Facility: CLINIC | Age: 22
End: 2022-10-07
Payer: COMMERCIAL

## 2022-10-07 DIAGNOSIS — J34.3 NASAL TURBINATE HYPERTROPHY: ICD-10-CM

## 2022-10-07 DIAGNOSIS — G47.33 OSA (OBSTRUCTIVE SLEEP APNEA): ICD-10-CM

## 2022-10-07 DIAGNOSIS — J34.2 NASAL SEPTAL DEVIATION: ICD-10-CM

## 2022-10-07 DIAGNOSIS — R09.81 NASAL CONGESTION: Primary | ICD-10-CM

## 2022-10-07 PROCEDURE — 99214 OFFICE O/P EST MOD 30 MIN: CPT | Performed by: OTOLARYNGOLOGY

## 2022-10-07 ASSESSMENT — PAIN SCALES - GENERAL: PAINLEVEL: NO PAIN (0)

## 2022-10-07 NOTE — NURSING NOTE
Nasal Obstruction Symptom Evaluation (NOSE QUESTIONNAIRE):       3 - a fairly bad problem: Nasal congestion or stuffiness?  3 - a fairly bad problem: Nasal blockage or obstruction?  3 - a fairly bad problem: Trouble breathing through his nose?  2 - a moderate problem: Trouble sleeping?  4 - a severe problem: Inability to get enough air through his nose during exercise or exertion?      15 = Total NOSE score

## 2022-10-07 NOTE — NURSING NOTE
Lamine Cordero's goals for this visit include:   Chief Complaint   Patient presents with     Nose Problem     Recheck nose, no change with daily budesonide use       He requests these members of his care team be copied on today's visit information:   Chief Complaint   Patient presents with     Nose Problem     Recheck nose, no change with daily budesonide use         PCP: Homer Guerrero, [facility]    Referring Provider:  No referring provider defined for this encounter.    There were no vitals taken for this visit.    Do you need any medication refills at today's visit? No    Heather Chavez RN

## 2022-10-07 NOTE — LETTER
10/7/2022         RE: Lamine Cordero  [address]  [place] [address]        Dear Colleague,    Thank you for referring your patient, Lamine Cordero, to the [facility]. Please see a copy of my visit note below.    HPI    This pleasant patient is here for the f/u. He continues to have nasal congestion. Describes snoring, nasal congestion  That alternates and a possible apnea. No seasonal fluctuations but year round symptoms. States that he feels tired in the morning. Denies any morning headache, runny nose, sneezing, coughing, environmental allergies or facial pressure.     Review of Systems   Constitutional: Negative.    HENT: Positive for congestion and nosebleeds. Negative for ear discharge, ear pain, hearing loss, sinus pain, sore throat and tinnitus.    Eyes: Negative for blurred vision, double vision and photophobia.   Respiratory: Negative for cough, hemoptysis, sputum production, shortness of breath and stridor.    Gastrointestinal: Negative for heartburn, nausea and vomiting.   Skin: Negative.    Neurological: Negative for dizziness, tingling, tremors and headaches.   Endo/Heme/Allergies: Negative for environmental allergies. Does not bruise/bleed easily.         Physical Exam  Vitals reviewed.   Constitutional:       Appearance: Normal appearance.   HENT:      Head: Normocephalic and atraumatic.      Right Ear: Tympanic membrane, ear canal and external ear normal. No middle ear effusion. There is no impacted cerumen.      Left Ear: Tympanic membrane, ear canal and external ear normal.  No middle ear effusion. There is no impacted cerumen.      Nose: Septal deviation to the right and congestion present. No rhinorrhea.      Right Turbinates: Swollen.      Left Turbinates: Swollen.      Mouth/Throat:      Mouth: Mucous membranes are moist.      Pharynx: Oropharynx is clear. Uvula midline.      Tonsils: 2+ on the right. 2+ on the left.   Eyes:      Extraocular Movements:  Extraocular movements intact.      Pupils: Pupils are equal, round, and reactive to light.   Neurological:      Mental Status: He is alert.   Tongue position: III. Soft palate and hard palate are seen.      A/P  This pleasant patient is having septal deviation, inferior turbinate hypertrophy, and possible obstructive sleep apnea. Options including medical and surgical treatments were discussed. I will refer him to sleep medicine and give him a topical nasal steroid spray once a day for 6 weeks and f/u as scheduled.        Again, thank you for allowing me to participate in the care of your patient.        Sincerely,        Pao Sy MD

## 2022-10-07 NOTE — PROGRESS NOTES
HPI    This pleasant patient is here for the f/u. He continues to have nasal congestion. Describes snoring, nasal congestion  That alternates and a possible apnea. No seasonal fluctuations but year round symptoms. States that he feels tired in the morning. Denies any morning headache, runny nose, sneezing, coughing, environmental allergies or facial pressure.     Review of Systems   Constitutional: Negative.    HENT: Positive for congestion and nosebleeds. Negative for ear discharge, ear pain, hearing loss, sinus pain, sore throat and tinnitus.    Eyes: Negative for blurred vision, double vision and photophobia.   Respiratory: Negative for cough, hemoptysis, sputum production, shortness of breath and stridor.    Gastrointestinal: Negative for heartburn, nausea and vomiting.   Skin: Negative.    Neurological: Negative for dizziness, tingling, tremors and headaches.   Endo/Heme/Allergies: Negative for environmental allergies. Does not bruise/bleed easily.         Physical Exam  Vitals reviewed.   Constitutional:       Appearance: Normal appearance.   HENT:      Head: Normocephalic and atraumatic.      Right Ear: Tympanic membrane, ear canal and external ear normal. No middle ear effusion. There is no impacted cerumen.      Left Ear: Tympanic membrane, ear canal and external ear normal.  No middle ear effusion. There is no impacted cerumen.      Nose: Septal deviation to the right and congestion present. No rhinorrhea.      Right Turbinates: Swollen.      Left Turbinates: Swollen.      Mouth/Throat:      Mouth: Mucous membranes are moist.      Pharynx: Oropharynx is clear. Uvula midline.      Tonsils: 2+ on the right. 2+ on the left.   Eyes:      Extraocular Movements: Extraocular movements intact.      Pupils: Pupils are equal, round, and reactive to light.   Neurological:      Mental Status: He is alert.   Tongue position: III. Soft palate and hard palate are seen.      A/P  This pleasant patient is having septal  deviation, inferior turbinate hypertrophy, and possible obstructive sleep apnea. Options including medical and surgical treatments were discussed. I will refer him to sleep medicine and give him a topical nasal steroid spray once a day for 6 weeks and f/u as scheduled.

## 2022-11-19 ENCOUNTER — HEALTH MAINTENANCE LETTER (OUTPATIENT)
Age: 22
End: 2022-11-19

## 2022-12-16 PROBLEM — E66.09 CLASS 1 OBESITY DUE TO EXCESS CALORIES WITHOUT SERIOUS COMORBIDITY WITH BODY MASS INDEX (BMI) OF 31.0 TO 31.9 IN ADULT: Chronic | Status: ACTIVE | Noted: 2022-12-16

## 2022-12-16 PROBLEM — E66.811 CLASS 1 OBESITY DUE TO EXCESS CALORIES WITHOUT SERIOUS COMORBIDITY WITH BODY MASS INDEX (BMI) OF 31.0 TO 31.9 IN ADULT: Chronic | Status: ACTIVE | Noted: 2022-12-16

## 2022-12-19 ENCOUNTER — VIRTUAL VISIT (OUTPATIENT)
Dept: SLEEP MEDICINE | Facility: CLINIC | Age: 22
End: 2022-12-19
Payer: COMMERCIAL

## 2022-12-19 VITALS — BODY MASS INDEX: 31.88 KG/M2 | HEIGHT: 77 IN | WEIGHT: 270 LBS

## 2022-12-19 DIAGNOSIS — R06.89 DYSPNEA AND RESPIRATORY ABNORMALITY: Primary | ICD-10-CM

## 2022-12-19 DIAGNOSIS — E66.09 CLASS 1 OBESITY DUE TO EXCESS CALORIES WITHOUT SERIOUS COMORBIDITY WITH BODY MASS INDEX (BMI) OF 31.0 TO 31.9 IN ADULT: Chronic | ICD-10-CM

## 2022-12-19 DIAGNOSIS — E66.811 CLASS 1 OBESITY DUE TO EXCESS CALORIES WITHOUT SERIOUS COMORBIDITY WITH BODY MASS INDEX (BMI) OF 31.0 TO 31.9 IN ADULT: Chronic | ICD-10-CM

## 2022-12-19 DIAGNOSIS — R06.00 DYSPNEA AND RESPIRATORY ABNORMALITY: Primary | ICD-10-CM

## 2022-12-19 PROBLEM — F41.9 ANXIETY: Status: RESOLVED | Noted: 2022-07-06 | Resolved: 2022-12-19

## 2022-12-19 PROCEDURE — 99204 OFFICE O/P NEW MOD 45 MIN: CPT | Mod: GT | Performed by: INTERNAL MEDICINE

## 2022-12-19 ASSESSMENT — SLEEP AND FATIGUE QUESTIONNAIRES
HOW LIKELY ARE YOU TO NOD OFF OR FALL ASLEEP WHILE SITTING AND READING: WOULD NEVER DOZE
HOW LIKELY ARE YOU TO NOD OFF OR FALL ASLEEP WHILE SITTING AND TALKING TO SOMEONE: WOULD NEVER DOZE
HOW LIKELY ARE YOU TO NOD OFF OR FALL ASLEEP WHILE WATCHING TV: SLIGHT CHANCE OF DOZING
HOW LIKELY ARE YOU TO NOD OFF OR FALL ASLEEP WHILE SITTING QUIETLY AFTER LUNCH WITHOUT ALCOHOL: WOULD NEVER DOZE
HOW LIKELY ARE YOU TO NOD OFF OR FALL ASLEEP WHEN YOU ARE A PASSENGER IN A CAR FOR AN HOUR WITHOUT A BREAK: WOULD NEVER DOZE
HOW LIKELY ARE YOU TO NOD OFF OR FALL ASLEEP IN A CAR, WHILE STOPPED FOR A FEW MINUTES IN TRAFFIC: WOULD NEVER DOZE
HOW LIKELY ARE YOU TO NOD OFF OR FALL ASLEEP WHILE SITTING INACTIVE IN A PUBLIC PLACE: SLIGHT CHANCE OF DOZING
HOW LIKELY ARE YOU TO NOD OFF OR FALL ASLEEP WHILE LYING DOWN TO REST IN THE AFTERNOON WHEN CIRCUMSTANCES PERMIT: WOULD NEVER DOZE
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
## 2022-12-19 NOTE — PATIENT INSTRUCTIONS

## 2022-12-20 ENCOUNTER — TELEPHONE (OUTPATIENT)
Dept: SLEEP MEDICINE | Facility: CLINIC | Age: 22
End: 2022-12-20

## 2023-09-10 ENCOUNTER — HEALTH MAINTENANCE LETTER (OUTPATIENT)
Age: 23
End: 2023-09-10

## 2024-01-23 ENCOUNTER — OFFICE VISIT (OUTPATIENT)
Dept: FAMILY MEDICINE | Facility: CLINIC | Age: 24
End: 2024-01-23
Payer: COMMERCIAL

## 2024-01-23 ENCOUNTER — ANCILLARY PROCEDURE (OUTPATIENT)
Dept: GENERAL RADIOLOGY | Facility: CLINIC | Age: 24
End: 2024-01-23
Attending: PEDIATRICS
Payer: COMMERCIAL

## 2024-01-23 ENCOUNTER — OFFICE VISIT (OUTPATIENT)
Dept: ORTHOPEDICS | Facility: CLINIC | Age: 24
End: 2024-01-23
Payer: COMMERCIAL

## 2024-01-23 VITALS
WEIGHT: 274 LBS | BODY MASS INDEX: 32.35 KG/M2 | RESPIRATION RATE: 16 BRPM | HEART RATE: 85 BPM | SYSTOLIC BLOOD PRESSURE: 125 MMHG | OXYGEN SATURATION: 97 % | TEMPERATURE: 97.3 F | HEIGHT: 77 IN | DIASTOLIC BLOOD PRESSURE: 80 MMHG

## 2024-01-23 VITALS — HEIGHT: 77 IN | BODY MASS INDEX: 32.35 KG/M2 | WEIGHT: 274 LBS

## 2024-01-23 DIAGNOSIS — S49.91XA INJURY OF RIGHT CLAVICLE, INITIAL ENCOUNTER: ICD-10-CM

## 2024-01-23 DIAGNOSIS — S42.021A CLOSED DISPLACED FRACTURE OF SHAFT OF RIGHT CLAVICLE, INITIAL ENCOUNTER: Primary | ICD-10-CM

## 2024-01-23 DIAGNOSIS — S42.001D CLOSED NONDISPLACED FRACTURE OF RIGHT CLAVICLE WITH ROUTINE HEALING, UNSPECIFIED PART OF CLAVICLE, SUBSEQUENT ENCOUNTER: Primary | ICD-10-CM

## 2024-01-23 PROCEDURE — 99204 OFFICE O/P NEW MOD 45 MIN: CPT | Mod: 57 | Performed by: PEDIATRICS

## 2024-01-23 PROCEDURE — 23500 CLTX CLAVICULAR FX W/O MNPJ: CPT | Mod: RT | Performed by: PEDIATRICS

## 2024-01-23 PROCEDURE — 99213 OFFICE O/P EST LOW 20 MIN: CPT | Performed by: FAMILY MEDICINE

## 2024-01-23 PROCEDURE — 73000 X-RAY EXAM OF COLLAR BONE: CPT | Mod: TC | Performed by: STUDENT IN AN ORGANIZED HEALTH CARE EDUCATION/TRAINING PROGRAM

## 2024-01-23 RX ORDER — HYDROCODONE BITARTRATE AND ACETAMINOPHEN 5; 325 MG/1; MG/1
1 TABLET ORAL EVERY 6 HOURS PRN
COMMUNITY
End: 2024-01-23

## 2024-01-23 RX ORDER — OXYCODONE AND ACETAMINOPHEN 5; 325 MG/1; MG/1
1 TABLET ORAL EVERY 6 HOURS PRN
Qty: 12 TABLET | Refills: 0 | Status: SHIPPED | OUTPATIENT
Start: 2024-01-23 | End: 2024-01-26

## 2024-01-23 ASSESSMENT — PAIN SCALES - GENERAL: PAINLEVEL: EXTREME PAIN (8)
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
## 2024-01-23 NOTE — PATIENT INSTRUCTIONS
Reviewed nature of the injury, displaced right clavicle fracture.  Symptom management with icing, over-the-counter medication first-line.  Given lack of desired pain relieving benefit from the hydrocodone that you had, we can try Percocet (oxycodone).  Prescription placed today.  Note that there is acetaminophen in this already.  Reviewed support options, including continuing with shoulder immobilizer, versus sling, versus possible figure-of-eight strap.  Okay for shoulder immobilizer use if comfortable.  Otherwise, I think a sling is a good alternative, and use reviewed today.  Provided a letter for work.  Anticipate remaining off work for now, given nature of the work.  Plan recheck in approximately 7 to 10 days, with repeat x-rays of the right clavicle, sooner if needed.  If improving during that time, we can discuss some potential return to restricted work then.  Contact clinic for other questions or concerns in the meantime.    If you have any further questions for your physician or physician s care team you can contact them thru [employer] or by calling [phone].
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
## 2024-01-23 NOTE — Clinical Note
Hi-- Thanks for the referral. FYI, we do not have an orthopedic surgery walk in clinic (I reviewed your note). Our walk in clinic is staffed by sports medicine physicians in [place]. If desiring a patient to see ortho surgery, the  order is best. In this case, I don't think surgery is required. Let me know if any questions. Kobi

## 2024-02-02 ENCOUNTER — ANCILLARY PROCEDURE (OUTPATIENT)
Dept: GENERAL RADIOLOGY | Facility: CLINIC | Age: 24
End: 2024-02-02
Attending: PEDIATRICS
Payer: COMMERCIAL

## 2024-02-02 ENCOUNTER — OFFICE VISIT (OUTPATIENT)
Dept: ORTHOPEDICS | Facility: CLINIC | Age: 24
End: 2024-02-02
Payer: COMMERCIAL

## 2024-02-02 VITALS — BODY MASS INDEX: 32.35 KG/M2 | WEIGHT: 274 LBS | HEIGHT: 77 IN

## 2024-02-02 DIAGNOSIS — S42.021A CLOSED DISPLACED FRACTURE OF SHAFT OF RIGHT CLAVICLE: ICD-10-CM

## 2024-02-02 DIAGNOSIS — S42.021D CLOSED DISPLACED FRACTURE OF SHAFT OF RIGHT CLAVICLE WITH ROUTINE HEALING, SUBSEQUENT ENCOUNTER: Primary | ICD-10-CM

## 2024-02-02 PROCEDURE — 99207 PR FRACTURE CARE IN GLOBAL PERIOD: CPT | Performed by: PEDIATRICS

## 2024-02-02 PROCEDURE — 73000 X-RAY EXAM OF COLLAR BONE: CPT | Mod: TC | Performed by: RADIOLOGY

## 2024-02-02 NOTE — PATIENT INSTRUCTIONS
Initial and subsequent radiographs appear to demonstrate a fracture of the right clavicle, with what appeared to be mild displacement, and that may be more in the anterior posterior plane.  X-rays from today again show some clavicle irregularity, though otherwise not a traumatic fracture pattern.  With the overall improvement that you have had, it is possible this is more of a contusion or soft tissue injury, rather than bony, though the initial mechanism of injury and overall presentation is most consistent with fracture.  For next steps, we discussed 1) continued management as though this is a fracture, 2) attaining comparison x-rays of the left clavicle, 3) advanced imaging with CT scan to assess for fracture.  Following discussion, plan continued management as this is a fracture, though there has been improvement.  May continue with immobilizer or can use sling for support as desired.  Provided letter for work, plan to return to work with work restrictions, limited use of the right upper extremity.  Tentatively plan to recheck approximately 2 to 3 weeks, with repeat x-rays of the clavicle at that time if any symptoms persist.  Contact clinic or follow-up sooner if needed.    If you have any further questions for your physician or physician s care team you can contact them thru [employer] or by calling [phone].

## 2024-02-02 NOTE — PROGRESS NOTES
ASSESSMENT & PLAN    Lamine was seen today for follow up.    Diagnoses and all orders for this visit:    Closed displaced fracture of shaft of right clavicle with routine healing, subsequent encounter  -     XR Clavicle RT; Future      Significant clinical improvement along with x-ray report from last visit raise question of whether this is a fracture, but initial mechanism of injury along with my interpretation of the x-rays, along with images from today, support fracture.  We discussed treating as such.  See below.  Questions answered. Discussed signs and symptoms that may indicate more serious issues; the patient was instructed to seek appropriate care if noted. Lamine indicates understanding of these issues and agrees with the plan.      See Patient Instructions  Patient Instructions   Initial and subsequent radiographs appear to demonstrate a fracture of the right clavicle, with what appeared to be mild displacement, and that may be more in the anterior posterior plane.  X-rays from today again show some clavicle irregularity, though otherwise not a traumatic fracture pattern.  With the overall improvement that you have had, it is possible this is more of a contusion or soft tissue injury, rather than bony, though the initial mechanism of injury and overall presentation is most consistent with fracture.  For next steps, we discussed 1) continued management as though this is a fracture, 2) attaining comparison x-rays of the left clavicle, 3) advanced imaging with CT scan to assess for fracture.  Following discussion, plan continued management as this is a fracture, though there has been improvement.  May continue with immobilizer or can use sling for support as desired.  Provided letter for work, plan to return to work with work restrictions, limited use of the right upper extremity.  Tentatively plan to recheck approximately 2 to 3 weeks, with repeat x-rays of the clavicle at that time if any symptoms persist.   "Contact clinic or follow-up sooner if needed.    If you have any further questions for your physician or physician s care team you can contact them thru MyChart or by calling [phone].      Kobi Chambers [facility] [place]    SUBJECTIVE- Interim History February 2, 2024    No chief complaint on file.      Lamine Cordero is a 23 year old male who is seen in f/u up for Closed displaced fracture of shaft of right clavicle. Since last visit on 1/23/24 patient has felt like his shoulder is improving. Reports that if he is to raise his arm up above the head it feels like he has a 10 pound weight, noting that the muscles seem to \"bunch and get tired and sore\".  - Now ~ 13 days from initial onset    The patient is seen by themselves.  The patient is Right handed    Orthopedic/Surgical history: NO  Social History/Occupation:       REVIEW OF SYSTEMS:  Review of Systems    OBJECTIVE:  Ht 1.956 m (6' 5\")   Wt 124.3 kg (274 lb)   BMI 32.49 kg/m         Right shoulder with more comfortable range of motion today  Mild tenderness medially at clavicle, and inferior to clavicle near midshaft, but no significant tenderness along clavicle otherwise.      RADIOLOGY:  Final results and radiologist's interpretation, available in the [facility] health record.  Images were reviewed with the patient in the office today.  My personal interpretation of the performed imaging: again note irregularity of the clavicle, consistent with a relatively long oblique fracture, with AP separation/displacement. Overall alignment not significantly changed compared to previous.      Recent Results (from the past 24 hour(s))   XR Clavicle RT    Narrative    XR CLAVICLE RIGHT 2 VIEWS 2/2/2024 12:56 PM     HISTORY: 13 days from onset, subsequent xr; Closed displaced fracture  of shaft of right clavicle    COMPARISON: 1/23/2024.       Impression    IMPRESSION:   Acute appearing mildly displaced oblique fracture of " the right  clavicular midshaft is likely displaced anterior to posteriorly as the  fracture plane is not well seen radiographically. CT could assess  fracture morphology further if needed. Normal AC joint.    NOTE: ABNORMAL REPORT    THE DICTATION ABOVE DESCRIBES AN ABNORMAL REPORT FOR WHICH FOLLOW-UP  IS NEEDED.    CHRISTIANA OREILLY MD         SYSTEM ID:  [id number]

## 2024-02-02 NOTE — LETTER
2/2/2024         RE: Lamine Cordero  [address]  [place] [address]        Dear Colleague,    Thank you for referring your patient, Lamine Cordero, to the [facility] [place]. Please see a copy of my visit note below.    ASSESSMENT & PLAN    Lamine was seen today for follow up.    Diagnoses and all orders for this visit:    Closed displaced fracture of shaft of right clavicle with routine healing, subsequent encounter  -     XR Clavicle RT; Future      Significant clinical improvement along with x-ray report from last visit raise question of whether this is a fracture, but initial mechanism of injury along with my interpretation of the x-rays, along with images from today, support fracture.  We discussed treating as such.  See below.  Questions answered. Discussed signs and symptoms that may indicate more serious issues; the patient was instructed to seek appropriate care if noted. Lamine indicates understanding of these issues and agrees with the plan.      See Patient Instructions  Patient Instructions   Initial and subsequent radiographs appear to demonstrate a fracture of the right clavicle, with what appeared to be mild displacement, and that may be more in the anterior posterior plane.  X-rays from today again show some clavicle irregularity, though otherwise not a traumatic fracture pattern.  With the overall improvement that you have had, it is possible this is more of a contusion or soft tissue injury, rather than bony, though the initial mechanism of injury and overall presentation is most consistent with fracture.  For next steps, we discussed 1) continued management as though this is a fracture, 2) attaining comparison x-rays of the left clavicle, 3) advanced imaging with CT scan to assess for fracture.  Following discussion, plan continued management as this is a fracture, though there has been improvement.  May continue with immobilizer or can use sling for support  "as desired.  Provided letter for work, plan to return to work with work restrictions, limited use of the right upper extremity.  Tentatively plan to recheck approximately 2 to 3 weeks, with repeat x-rays of the clavicle at that time if any symptoms persist.  Contact clinic or follow-up sooner if needed.    If you have any further questions for your physician or physician s care team you can contact them thru MyChart or by calling [phone].      [facility] [place]    SUBJECTIVE- Interim History February 2, 2024    No chief complaint on file.      Lamine Cordero is a 23 year old male who is seen in f/u up for Closed displaced fracture of shaft of right clavicle. Since last visit on 1/23/24 patient has felt like his shoulder is improving. Reports that if he is to raise his arm up above the head it feels like he has a 10 pound weight, noting that the muscles seem to \"bunch and get tired and sore\".  - Now ~ 13 days from initial onset    The patient is seen by themselves.  The patient is Right handed    Orthopedic/Surgical history: NO  Social History/Occupation:       REVIEW OF SYSTEMS:  Review of Systems    OBJECTIVE:  Ht 1.956 m (6' 5\")   Wt 124.3 kg (274 lb)   BMI 32.49 kg/m         Right shoulder with more comfortable range of motion today  Mild tenderness medially at clavicle, and inferior to clavicle near midshaft, but no significant tenderness along clavicle otherwise.      RADIOLOGY:  Final results and radiologist's interpretation, available in the [facility] health record.  Images were reviewed with the patient in the office today.  My personal interpretation of the performed imaging: again note irregularity of the clavicle, consistent with a relatively long oblique fracture, with AP separation/displacement. Overall alignment not significantly changed compared to previous.      Recent Results (from the past 24 hour(s))   XR Clavicle RT    Narrative    " XR CLAVICLE RIGHT 2 VIEWS 2/2/2024 12:56 PM     HISTORY: 13 days from onset, subsequent xr; Closed displaced fracture  of shaft of right clavicle    COMPARISON: 1/23/2024.       Impression    IMPRESSION:   Acute appearing mildly displaced oblique fracture of the right  clavicular midshaft is likely displaced anterior to posteriorly as the  fracture plane is not well seen radiographically. CT could assess  fracture morphology further if needed. Normal AC joint.    NOTE: ABNORMAL REPORT    THE DICTATION ABOVE DESCRIBES AN ABNORMAL REPORT FOR WHICH FOLLOW-UP  IS NEEDED.    CHRISTIANA OREILLY MD         SYSTEM ID:  [id number]                        Again, thank you for allowing me to participate in the care of your patient.        Sincerely,        Kobi Chambers DO

## 2024-02-02 NOTE — LETTER
February 2, 2024      Lamine Cordero  [address] [address]        To Whom It May Concern:    Lamine Cordero was seen in our clinic for recheck of injury. He may return to work with the following: limited to light duty.  With right upper extremity: no lift, carry, push, pull independently. Can use right hand as helper for left if comfortable.  No reaching away from body with right arm.  Ok for use of keyboard/computer, paperwork.  Restrictions in place for 3 weeks.        Sincerely,            Kobi Chambers

## 2024-02-08 ENCOUNTER — MYC MEDICAL ADVICE (OUTPATIENT)
Dept: ORTHOPEDICS | Facility: CLINIC | Age: 24
End: 2024-02-08
Payer: COMMERCIAL

## 2024-02-08 DIAGNOSIS — S49.91XD INJURY OF RIGHT CLAVICLE, SUBSEQUENT ENCOUNTER: Primary | ICD-10-CM

## 2024-02-09 NOTE — TELEPHONE ENCOUNTER
Patient seen 2/2/2024 for closed displaced fracture of shaft of right clavicle.     Patient is complaining of pain and pressure in the arm as well as tight pain in the base of his neck on the right side. Feels this increases due to work.     Would like to move forward with CT.     Please advise pending order (not sure if correct order or if you'd prefer shoulder).     Mami, ATC

## 2024-02-09 NOTE — TELEPHONE ENCOUNTER
Appreciate the photos. Right hand looks a little violeta relative to left.  Question whether there may be some vascular compromise with increased right upper extremity use.  CT scan clavicle order signed to assess clavicle better, and with symptoms to right UE.  Ordered as stat, though primarily as I do not want pt to have to wait 1 week for CT; it is not emergent, can be scheduled in the next few days, if pt's schedule allows.  Would advise reducing use of right UE at work to avoid the swelling, color change. Can provide letter to reflect this. Given already restricted use of right UE, may need to indicate no use of right UE while at work.  Thanks.  Kobi Chambers, , CAQ

## 2024-02-12 ENCOUNTER — ANCILLARY PROCEDURE (OUTPATIENT)
Dept: CT IMAGING | Facility: CLINIC | Age: 24
End: 2024-02-12
Attending: PEDIATRICS
Payer: COMMERCIAL

## 2024-02-12 ENCOUNTER — TELEPHONE (OUTPATIENT)
Dept: ORTHOPEDICS | Facility: CLINIC | Age: 24
End: 2024-02-12

## 2024-02-12 DIAGNOSIS — S49.91XD INJURY OF RIGHT CLAVICLE, SUBSEQUENT ENCOUNTER: ICD-10-CM

## 2024-02-12 PROCEDURE — 73200 CT UPPER EXTREMITY W/O DYE: CPT | Mod: RT | Performed by: RADIOLOGY

## 2024-02-12 NOTE — TELEPHONE ENCOUNTER
Please advise of CT findings.  There is a somewhat displaced clavicle fracture, but no obvious vascular compromise.  There actually appears to be some healing present, some early callus formation, but no solid bony bridging.  Would advise continuing to limit use of the right upper extremity, until greater level of healing.  Can provide additional documentation for work, if needed.  Otherwise, advise recheck in about 2 more weeks, with repeat x-rays of right clavicle, sooner if needed.  This would then be approximately 5 weeks (more than 1 month) from injury.  I would be happy to have a visit with the patient (in person, by video, or by phone) to discuss further if that would be helpful.  Thanks.  Kobi Chambers DO, CAQ        CT Clavicle Right w/o Contrast    Narrative    Exam: CT CLAVICLE BILATERAL W/O CONTRAST 2/12/2024 8:28 AM    History: evaluate suspected fracture; also with intermittent swelling,  color change to right upper extremity with right arm use; Injury of  right clavicle, subsequent encounter    Techniques: Multislice CT examination was performed of the bilateral  clavicles with multiplanar reconstructions displayed in multiple  window settings. Imaging was performed without IV contrast material.     DLP: 479 mGy-cm    Comparison: 2/2/2024, 1/23/2024    Findings:     image(s) demonstrate(s) no focal airspace opacity.     Bones:    Redemonstration displaced, longitudinal split type fracture of the  right mid clavicular shaft with mild approximately 3 cm  overlap/foreshortening. Approximately 8 mm anterior posterior  displacement/gap. Evidence of early callus formation without bony  bridging.    No additional fracture. Acromioclavicular and sternoclavicular joints  are congruent.    Soft tissues:   Evaluation of the soft tissue, particularly internal derangement of  joint assessment is limited with CT technique.     Soft tissue stranding is around the right midclavicular fracture area.  No  associated hematoma. Mild prevascular space stranding, likely  physiologic residual thymic tissue.      Impression    IMPRESSION:  Redemonstration 8 mm AP dimension displaced, longitudinal  split type fracture of the right mid clavicular shaft with  approximately 3 cm overlap/foreshortening. Evidence of early callus  formation without bony bridging.    [employer]         SYSTEM ID:  [id number]

## 2024-10-25 ENCOUNTER — OFFICE VISIT (OUTPATIENT)
Dept: FAMILY MEDICINE | Facility: CLINIC | Age: 24
End: 2024-10-25
Payer: COMMERCIAL

## 2024-10-25 VITALS
BODY MASS INDEX: 32 KG/M2 | WEIGHT: 276.6 LBS | SYSTOLIC BLOOD PRESSURE: 124 MMHG | OXYGEN SATURATION: 98 % | HEART RATE: 80 BPM | HEIGHT: 78 IN | DIASTOLIC BLOOD PRESSURE: 81 MMHG | TEMPERATURE: 97.7 F | RESPIRATION RATE: 12 BRPM

## 2024-10-25 DIAGNOSIS — G47.00 INSOMNIA, UNSPECIFIED TYPE: ICD-10-CM

## 2024-10-25 DIAGNOSIS — F39 MOOD DISORDER (H): ICD-10-CM

## 2024-10-25 DIAGNOSIS — R53.83 OTHER FATIGUE: ICD-10-CM

## 2024-10-25 DIAGNOSIS — Z00.00 ROUTINE GENERAL MEDICAL EXAMINATION AT A HEALTH CARE FACILITY: Primary | ICD-10-CM

## 2024-10-25 DIAGNOSIS — Z13.1 SCREENING FOR DIABETES MELLITUS: ICD-10-CM

## 2024-10-25 DIAGNOSIS — Z13.220 SCREENING, LIPID: ICD-10-CM

## 2024-10-25 DIAGNOSIS — Z11.4 SCREENING FOR HIV (HUMAN IMMUNODEFICIENCY VIRUS): ICD-10-CM

## 2024-10-25 LAB
BASOPHILS # BLD AUTO: 0 10E3/UL (ref 0–0.2)
BASOPHILS NFR BLD AUTO: 1 %
EOSINOPHIL # BLD AUTO: 0.2 10E3/UL (ref 0–0.7)
EOSINOPHIL NFR BLD AUTO: 3 %
ERYTHROCYTE [DISTWIDTH] IN BLOOD BY AUTOMATED COUNT: 12.7 % (ref 10–15)
EST. AVERAGE GLUCOSE BLD GHB EST-MCNC: 100 MG/DL
HBA1C MFR BLD: 5.1 % (ref 0–5.6)
HCT VFR BLD AUTO: 44.2 % (ref 40–53)
HGB BLD-MCNC: 15.4 G/DL (ref 13.3–17.7)
IMM GRANULOCYTES # BLD: 0 10E3/UL
IMM GRANULOCYTES NFR BLD: 0 %
LYMPHOCYTES # BLD AUTO: 2.1 10E3/UL (ref 0.8–5.3)
LYMPHOCYTES NFR BLD AUTO: 32 %
MCH RBC QN AUTO: 29.7 PG (ref 26.5–33)
MCHC RBC AUTO-ENTMCNC: 34.8 G/DL (ref 31.5–36.5)
MCV RBC AUTO: 85 FL (ref 78–100)
MONOCYTES # BLD AUTO: 0.6 10E3/UL (ref 0–1.3)
MONOCYTES NFR BLD AUTO: 9 %
NEUTROPHILS # BLD AUTO: 3.8 10E3/UL (ref 1.6–8.3)
NEUTROPHILS NFR BLD AUTO: 57 %
PLATELET # BLD AUTO: 236 10E3/UL (ref 150–450)
RBC # BLD AUTO: 5.19 10E6/UL (ref 4.4–5.9)
WBC # BLD AUTO: 6.7 10E3/UL (ref 4–11)

## 2024-10-25 PROCEDURE — 85025 COMPLETE CBC W/AUTO DIFF WBC: CPT | Performed by: PHYSICIAN ASSISTANT

## 2024-10-25 PROCEDURE — 80053 COMPREHEN METABOLIC PANEL: CPT | Performed by: PHYSICIAN ASSISTANT

## 2024-10-25 PROCEDURE — 83036 HEMOGLOBIN GLYCOSYLATED A1C: CPT | Performed by: PHYSICIAN ASSISTANT

## 2024-10-25 PROCEDURE — 87389 HIV-1 AG W/HIV-1&-2 AB AG IA: CPT | Performed by: PHYSICIAN ASSISTANT

## 2024-10-25 PROCEDURE — 36415 COLL VENOUS BLD VENIPUNCTURE: CPT | Performed by: PHYSICIAN ASSISTANT

## 2024-10-25 PROCEDURE — 82306 VITAMIN D 25 HYDROXY: CPT | Performed by: PHYSICIAN ASSISTANT

## 2024-10-25 PROCEDURE — 99395 PREV VISIT EST AGE 18-39: CPT | Performed by: PHYSICIAN ASSISTANT

## 2024-10-25 PROCEDURE — 80061 LIPID PANEL: CPT | Performed by: PHYSICIAN ASSISTANT

## 2024-10-25 PROCEDURE — 84443 ASSAY THYROID STIM HORMONE: CPT | Performed by: PHYSICIAN ASSISTANT

## 2024-10-25 RX ORDER — TRAZODONE HYDROCHLORIDE 50 MG/1
50-150 TABLET, FILM COATED ORAL AT BEDTIME
Qty: 60 TABLET | Refills: 1 | Status: SHIPPED | OUTPATIENT
Start: 2024-10-25

## 2024-10-25 SDOH — HEALTH STABILITY: PHYSICAL HEALTH: ON AVERAGE, HOW MANY MINUTES DO YOU ENGAGE IN EXERCISE AT THIS LEVEL?: 30 MIN

## 2024-10-25 SDOH — HEALTH STABILITY: PHYSICAL HEALTH: ON AVERAGE, HOW MANY DAYS PER WEEK DO YOU ENGAGE IN MODERATE TO STRENUOUS EXERCISE (LIKE A BRISK WALK)?: 3 DAYS

## 2024-10-25 ASSESSMENT — SOCIAL DETERMINANTS OF HEALTH (SDOH): HOW OFTEN DO YOU GET TOGETHER WITH FRIENDS OR RELATIVES?: ONCE A WEEK

## 2024-10-25 ASSESSMENT — PATIENT HEALTH QUESTIONNAIRE - PHQ9
10. IF YOU CHECKED OFF ANY PROBLEMS, HOW DIFFICULT HAVE THESE PROBLEMS MADE IT FOR YOU TO DO YOUR WORK, TAKE CARE OF THINGS AT HOME, OR GET ALONG WITH OTHER PEOPLE: VERY DIFFICULT
SUM OF ALL RESPONSES TO PHQ QUESTIONS 1-9: 19
SUM OF ALL RESPONSES TO PHQ QUESTIONS 1-9: 19

## 2024-10-25 NOTE — LETTER
November 4, 2024      Lamine Cordero  [address] [address]        Dear ,    We are writing to inform you of your test results.    Your recent test results are attached. Vitamin D is low at 19. This can cause fatigue. I would recommend taking over the counter vitamin D3 3,000 units daily and rechecking labs in about 3 months. Cholesterol ok for not being fasting. Thyroid hormone normal. Sodium and potassium normal. Blood sugar (glucose) normal.  Creatinine and GFR normal, which means kidney function is normal.   Liver enzymes normal. HIV test negative. A1c shows no diabetes. White and red blood cell counts normal. No anemia.     It was a pleasure to see you at your recent office visit.     Resulted Orders   Lipid panel reflex to direct LDL Non-fasting   Result Value Ref Range    Cholesterol 179 <200 mg/dL    Triglycerides 136 <150 mg/dL    Direct Measure HDL 36 (L) >=40 mg/dL    LDL Cholesterol Calculated 116 (H) <100 mg/dL    Non HDL Cholesterol 143 (H) <130 mg/dL    Patient Fasting > 8hrs? No     Narrative    Cholesterol  Desirable: < 200 mg/dL  Borderline High: 200 - 239 mg/dL  High: >= 240 mg/dL    Triglycerides  Normal: < 150 mg/dL  Borderline High: 150 - 199 mg/dL  High: 200-499 mg/dL  Very High: >= 500 mg/dL    Direct Measure HDL  Female: >= 50 mg/dL   Male: >= 40 mg/dL    LDL Cholesterol  Desirable: < 100 mg/dL  Above Desirable: 100 - 129 mg/dL   Borderline High: 130 - 159 mg/dL   High:  160 - 189 mg/dL   Very High: >= 190 mg/dL    Non HDL Cholesterol  Desirable: < 130 mg/dL  Above Desirable: 130 - 159 mg/dL  Borderline High: 160 - 189 mg/dL  High: 190 - 219 mg/dL  Very High: >= 220 mg/dL   CBC with platelets and differential   Result Value Ref Range    WBC Count 6.7 4.0 - 11.0 10e3/uL    RBC Count 5.19 4.40 - 5.90 10e6/uL    Hemoglobin 15.4 13.3 - 17.7 g/dL    Hematocrit 44.2 40.0 - 53.0 %    MCV 85 78 - 100 fL    MCH 29.7 26.5 - 33.0 pg    MCHC 34.8 31.5 - 36.5 g/dL    RDW 12.7  10.0 - 15.0 %    Platelet Count 236 150 - 450 10e3/uL    % Neutrophils 57 %    % Lymphocytes 32 %    % Monocytes 9 %    % Eosinophils 3 %    % Basophils 1 %    % Immature Granulocytes 0 %    Absolute Neutrophils 3.8 1.6 - 8.3 10e3/uL    Absolute Lymphocytes 2.1 0.8 - 5.3 10e3/uL    Absolute Monocytes 0.6 0.0 - 1.3 10e3/uL    Absolute Eosinophils 0.2 0.0 - 0.7 10e3/uL    Absolute Basophils 0.0 0.0 - 0.2 10e3/uL    Absolute Immature Granulocytes 0.0 <=0.4 10e3/uL       If you have any questions or concerns, please call the clinic at the number listed above.       Sincerely,      Pastora Celis PA-C

## 2024-10-25 NOTE — PROGRESS NOTES
"Preventive Care Visit  [facility] RASHI Celis PA-C, Family Medicine  Oct 25, 2024      Assessment & Plan     Routine general medical examination at a health care facility      Other fatigue  Will check labs  Sleeping about 5 hours is likely suspect  H/o low vitamin d also  I will follow up with labs   Consider sleep study in future  - CBC with Platelets & Differential; Future  - Comprehensive metabolic panel; Future  - Vitamin D Deficiency; Future  - TSH with free T4 reflex; Future  - CBC with Platelets & Differential  - Comprehensive metabolic panel  - Vitamin D Deficiency  - TSH with free T4 reflex    Insomnia, unspecified type  Side effects of medication and expected course of condition discussed.  Not interested in daily depression medication or talk therapy at this time. Would like labs done and to try sleeping medication.   - traZODone (DESYREL) 50 MG tablet; Take 1-3 tablets ( mg) by mouth at bedtime.    Screening for HIV (human immunodeficiency virus)    - HIV Antigen Antibody Combo Cascade; Future  - HIV Antigen Antibody Combo Cascade    Screening for diabetes mellitus    - Hemoglobin A1c; Future  - Hemoglobin A1c    Screening, lipid    - Lipid panel reflex to direct LDL Non-fasting; Future  - Lipid panel reflex to direct LDL Non-fasting    Mood disorder-Not interested in daily depression medication or talk therapy at this time. Would like labs done and to try sleeping medication. Given crisis info below.     BMI  Estimated body mass index is 31.96 kg/m  as calculated from the following:    Height as of this encounter: 1.981 m (6' 6\").    Weight as of this encounter: 125.5 kg (276 lb 9.6 oz).   Weight management plan: Discussed healthy diet and exercise guidelines    Depression Screening Follow Up        10/25/2024     3:07 PM   PHQ   PHQ-9 Total Score 19    Q9: Thoughts of better off dead/self-harm past 2 weeks Several days    F/U: Thoughts of suicide or self-harm " No    F/U: Safety concerns No        Patient-reported     Patient Instructions   Try sleeping medication 30 minutes before you lie down  If after 5 days this is not going well, send me a mychart and we will try something else       FREE AND CONFIDENTIAL 24/7 MENTAL HEALTH OR CRISIS HOTLINES:  BY COUNTY you live in:      Get Help in a Crisis in [facility]:  Call 911 if you or others are in immediate physical danger and need help from the police, fire department, or an ambulance.   Call 988 if you or someone close to you is having a mental health crisis. The [facility] Mobile Crisis Response team will help you. The line is open all day, every day, and the call is free. The Crisis Response line is for both adults and children.  Text [place] to [phone] to be connected with a counselor who will help defuse the crisis and connect the texter to local resources. Crisis Text Line is available 24 hours a day, seven days a week.   If you want more information about the services you see here, call:  - Adult Mental Health at [phone] or  - Children's Mental Health at [phone].      [facility] [facility] [phone]                                             Child [phone]    [place]             Adult [phone]                                            Child [phone]      Preventive Care Advice   This is general advice given by our system to help you stay healthy. However, your care team may have specific advice just for you. Please talk to your care team about your preventive care needs.  Nutrition  Eat 5 or more servings of fruits and vegetables each day.  Try wheat bread, brown rice and whole grain pasta (instead of white bread, rice, and pasta).  Get enough calcium and vitamin D. Check the label on foods and aim for 100% of the RDA (recommended daily allowance).  Lifestyle  Exercise at least 150 minutes each week  (30 minutes a day, 5 days a week).  Do muscle strengthening activities  2 days a week. These help control your weight and prevent disease.  No smoking.  Wear sunscreen to prevent skin cancer.  Have a dental exam and cleaning every 6 months.  Yearly exams  See your health care team every year to talk about:  Any changes in your health.  Any medicines your care team has prescribed.  Preventive care, family planning, and ways to prevent chronic diseases.  Shots (vaccines)   HPV shots (up to age 26), if you've never had them before.  Hepatitis B shots (up to age 59), if you've never had them before.  COVID-19 shot: Get this shot when it's due.  Flu shot: Get a flu shot every year.  Tetanus shot: Get a tetanus shot every 10 years.  Pneumococcal, hepatitis A, and RSV shots: Ask your care team if you need these based on your risk.  Shingles shot (for age 50 and up)  General health tests  Diabetes screening:  Starting at age 35, Get screened for diabetes at least every 3 years.  If you are younger than age 35, ask your care team if you should be screened for diabetes.  Cholesterol test: At age 39, start having a cholesterol test every 5 years, or more often if advised.  Bone density scan (DEXA): At age 50, ask your care team if you should have this scan for osteoporosis (brittle bones).  Hepatitis C: Get tested at least once in your life.  STIs (sexually transmitted infections)  Before age 24: Ask your care team if you should be screened for STIs.  After age 24: Get screened for STIs if you're at risk. You are at risk for STIs (including HIV) if:  You are sexually active with more than one person.  You don't use condoms every time.  You or a partner was diagnosed with a sexually transmitted infection.  If you are at risk for HIV, ask about PrEP medicine to prevent HIV.  Get tested for HIV at least once in your life, whether you are at risk for HIV or not.  Cancer screening tests  Cervical cancer screening: If you have a cervix, begin getting regular cervical cancer screening tests starting at  age 21.  Breast cancer scan (mammogram): If you've ever had breasts, begin having regular mammograms starting at age 40. This is a scan to check for breast cancer.  Colon cancer screening: It is important to start screening for colon cancer at age 45.  Have a colonoscopy test every 10 years (or more often if you're at risk) Or, ask your provider about stool tests like a FIT test every year or Cologuard test every 3 years.  To learn more about your testing options, visit:   .  For help making a decision, visit:   https://bit.ly/sv30408.  Prostate cancer screening test: If you have a prostate, ask your care team if a prostate cancer screening test (PSA) at age 55 is right for you.  Lung cancer screening: If you are a current or former smoker ages 50 to 80, ask your care team if ongoing lung cancer screenings are right for you.  For informational purposes only. Not to replace the advice of your health care provider. Copyright   2023 [facility] [employer]. All rights reserved. Clinically reviewed by the [facility] Trans[facility]. [employer] [id number] - REV 01/24.  Learning About Stress  What is stress?     Stress is your body's response to a hard situation. Your body can have a physical, emotional, or mental response. Stress is a fact of life for most people, and it affects everyone differently. What causes stress for you may not be stressful for someone else.  A lot of things can cause stress. You may feel stress when you go on a job interview, take a test, or run a race. This kind of short-term stress is normal and even useful. It can help you if you need to work hard or react quickly. For example, stress can help you finish an important job on time.  Long-term stress is caused by ongoing stressful situations or events. Examples of long-term stress include long-term health problems, ongoing problems at work, or conflicts in your family. Long-term stress can harm your health.  How does stress affect your  health?  When you are stressed, your body responds as though you are in danger. It makes hormones that speed up your heart, make you breathe faster, and give you a burst of energy. This is called the fight-or-flight stress response. If the stress is over quickly, your body goes back to normal and no harm is done.  But if stress happens too often or lasts too long, it can have bad effects. Long-term stress can make you more likely to get sick, and it can make symptoms of some diseases worse. If you tense up when you are stressed, you may develop neck, shoulder, or low back pain. Stress is linked to high blood pressure and heart disease.  Stress also harms your emotional health. It can make you morel, tense, or depressed. Your relationships may suffer, and you may not do well at work or school.  What can you do to manage stress?  You can try these things to help manage stress:   Do something active. Exercise or activity can help reduce stress. Walking is a great way to get started. Even everyday activities such as housecleaning or yard work can help.  Try yoga or silvia chi. These techniques combine exercise and meditation. You may need some training at first to learn them.  Do something you enjoy. For example, listen to music or go to a movie. Practice your hobby or do volunteer work.  Meditate. This can help you relax, because you are not worrying about what happened before or what may happen in the future.  Do guided imagery. Imagine yourself in any setting that helps you feel calm. You can use online videos, books, or a teacher to guide you.  Do breathing exercises. For example:  From a standing position, bend forward from the waist with your knees slightly bent. Let your arms dangle close to the floor.  Breathe in slowly and deeply as you return to a standing position. Roll up slowly and lift your head last.  Hold your breath for just a few seconds in the standing position.  Breathe out slowly and bend forward from  "the waist.  Let your feelings out. Talk, laugh, cry, and express anger when you need to. Talking with supportive friends or family, a counselor, or a mahesh leader about your feelings is a healthy way to relieve stress. Avoid discussing your feelings with people who make you feel worse.  Write. It may help to write about things that are bothering you. This helps you find out how much stress you feel and what is causing it. When you know this, you can find better ways to cope.  What can you do to prevent stress?  You might try some of these things to help prevent stress:  Manage your time. This helps you find time to do the things you want and need to do.  Get enough sleep. Your body recovers from the stresses of the day while you are sleeping.  Get support. Your family, friends, and community can make a difference in how you experience stress.  Limit your news feed. Avoid or limit time on social media or news that may make you feel stressed.  Do something active. Exercise or activity can help reduce stress. Walking is a great way to get started.  Where can you learn more?  Go to https://www.[employer].net/patiented  Enter N032 in the search box to learn more about \"Learning About Stress.\"  Current as of: October 24, 2023  Content Version: 14.2 2024 [employer].   Care instructions adapted under license by your healthcare professional. If you have questions about a medical condition or this instruction, always ask your healthcare professional. Healthwise[employer] Inc[employer] disclaims any warranty or liability for your use of this information.    Learning About Depression Screening  What is depression screening?  Depression screening is a way to see if you have depression symptoms. It may be done by a doctor or counselor. It's often part of a routine checkup. That's because your mental health is just as important as your physical health.  Depression is a mental health condition that affects how you feel, think, " "and act. You may:  Have less energy.  Lose interest in your daily activities.  Feel sad and grouchy for a long time.  Depression is very common. It affects people of all ages.  Many things can lead to depression. Some people become depressed after they have a stroke or find out they have a major illness like cancer or heart disease. The death of a loved one or a breakup may lead to depression. It can run in families. Most experts believe that a combination of inherited genes and stressful life events can cause it.  What happens during screening?  You may be asked to fill out a form about your depression symptoms. You and the doctor will discuss your answers. The doctor may ask you more questions to learn more about how you think, act, and feel.  What happens after screening?  If you have symptoms of depression, your doctor will talk to you about your options.  Doctors usually treat depression with medicines or counseling. Often, combining the two works best. Many people don't get help because they think that they'll get over the depression on their own. But people with depression may not get better unless they get treatment.  The cause of depression is not well understood. There may be many factors involved. But if you have depression, it's not your fault.  A serious symptom of depression is thinking about death or suicide. If you or someone you care about talks about this or about feeling hopeless, get help right away.  It's important to know that depression can be treated. Medicine, counseling, and self-care may help.  Where can you learn more?  Go to https://www.[employer].net/patiented  Enter T185 in the search box to learn more about \"Learning About Depression Screening.\"  Current as of: June 24, 2023  Content Version: 14.2 2024 [facility] [employer].   Care instructions adapted under license by your healthcare professional. If you have questions about a medical condition or this instruction, always ask " your healthcare professional. Healthwise, [employer] disclaims any warranty or liability for your use of this information.         Counseling  Appropriate preventive services were addressed with this patient via screening, questionnaire, or discussion as appropriate for fall prevention, nutrition, physical activity, Tobacco-use cessation, social engagement, weight loss and cognition.  Checklist reviewing preventive services available has been given to the patient.  Reviewed patient's diet, addressing concerns and/or questions.   He is at risk for lack of exercise and has been provided with information to increase physical activity for the benefit of his well-being.   The patient's PHQ-9 score is consistent with moderate depression. He was provided with information regarding depression.         Zana Raman is a 24 year old, presenting for the following:  Physical        10/25/2024     3:07 PM   Additional Questions   Roomed by Gila NAGEL CMA   Accompanied by alone         10/25/2024     3:07 PM   Patient Reported Additional Medications   Patient reports taking the following new medications none          HPI    Patient would like to discuss low energy and depression symptoms today.  Previous treatment or therapy? No.  No plan of self harm or suicide.   Fatigue-has a hard time falling asleep, sometimes staying asleep. No snoring. On average 5 hours of sleep. Tried melatonin didn't help. Usually hard time falling asleep can take 3 hours to fall asleep.   No known apnea. Has had issues with sleep for four years since his old schedule was working and then studying so he couldnt' get as much sleep.     SH-works as machinest.               10/25/2024     3:07 PM   PHQ   PHQ-9 Total Score 19    Q9: Thoughts of better off dead/self-harm past 2 weeks Several days    F/U: Thoughts of suicide or self-harm No    F/U: Safety concerns No        Patient-reported        Health Care Directive  Patient does not have a Health Care  Directive:       10/25/2024   General Health   How would you rate your overall physical health? (!) FAIR   Feel stress (tense, anxious, or unable to sleep) Rather much      (!) STRESS CONCERN      10/25/2024   Nutrition   Three or more servings of calcium each day? Yes   Diet: Regular (no restrictions)   How many servings of fruit and vegetables per day? (!) 2-3   How many sweetened beverages each day? (!) 2            10/25/2024   Exercise   Days per week of moderate/strenous exercise 3 days   Average minutes spent exercising at this level 30 min            10/25/2024   Social Factors   Frequency of gathering with friends or relatives Once a week   Worry food won't last until get money to buy more No   Food not last or not have enough money for food? No   Do you have housing? (Housing is defined as stable permanent housing and does not include staying ouside in a car, in a tent, in an abandoned building, in an overnight shelter, or couch-surfing.) Yes   Are you worried about losing your housing? No   Lack of transportation? No   Unable to get utilities (heat,electricity)? No            10/25/2024   Dental   Dentist two times every year? Yes            10/25/2024   TB Screening   Were you born outside of the [place]? No          Today's PHQ-9 Score:       10/25/2024     3:07 PM   PHQ-9 SCORE   PHQ-9 Total Score MyChart 19 (Moderately severe depression)   PHQ-9 Total Score 19        Patient-reported         10/25/2024   Substance Use   Alcohol more than 3/day or more than 7/wk No   Do you use any other substances recreationally? No        Social History     Tobacco Use    Smoking status: Never    Smokeless tobacco: Never    Tobacco comments:     Lives in smoke free household   Vaping Use    Vaping status: Former    Substances: Nicotine   Substance Use Topics    Alcohol use: Yes     Comment: 1/week    Drug use: No             10/25/2024   One time HIV Screening   Previous HIV test? I don't know          10/25/2024   STI  "Screening   New sexual partner(s) since last STI/HIV test? No            10/25/2024   Contraception/Family Planning   Questions about contraception or family planning No           Reviewed and updated as needed this visit by Provider                       Objective    Exam  /81   Pulse 80   Temp 97.7  F (36.5  C) (Temporal)   Resp 12   Ht 1.981 m (6' 6\")   Wt 125.5 kg (276 lb 9.6 oz)   SpO2 98%   BMI 31.96 kg/m     Estimated body mass index is 31.96 kg/m  as calculated from the following:    Height as of this encounter: 1.981 m (6' 6\").    Weight as of this encounter: 125.5 kg (276 lb 9.6 oz).    Physical Exam  GENERAL: alert and no distress  EYES: Eyes grossly normal to inspection, PERRL and conjunctivae and sclerae normal  HENT: ear canals and TM's normal, nose and mouth without ulcers or lesions  NECK: no adenopathy, no asymmetry, masses, or scars  RESP: lungs clear to auscultation - no rales, rhonchi or wheezes  CV: regular rate and rhythm, normal S1 S2, no S3 or S4, no murmur, click or rub, no peripheral edema  ABDOMEN: soft, nontender, no hepatosplenomegaly, no masses and bowel sounds normal  MS: no gross musculoskeletal defects noted, no edema  SKIN: no suspicious lesions or rashes  NEURO: Normal strength and tone, mentation intact and speech normal  PSYCH: mentation appears normal, affect normal/bright        Signed Electronically by: Pastora Celis PA-C    "

## 2024-10-25 NOTE — PATIENT INSTRUCTIONS
Try sleeping medication 30 minutes before you lie down  If after 5 days this is not going well, send me a mychart and we will try something else       FREE AND CONFIDENTIAL 24/7 MENTAL HEALTH OR CRISIS HOTLINES:  BY COUNTY you live in:      Get Help in a Crisis in [facility]:  Call 911 if you or others are in immediate physical danger and need help from the police, fire department, or an ambulance.   Call 988 if you or someone close to you is having a mental health crisis. The [facility] Mobile Crisis Response team will help you. The line is open all day, every day, and the call is free. The Crisis Response line is for both adults and children.  Text [place] to 358412 to be connected with a counselor who will help defuse the crisis and connect the texter to local resources. Crisis Text Line is available 24 hours a day, seven days a week.   If you want more information about the services you see here, call:  - Adult Mental Health at [phone] or  - Children's Mental Health at [phone].      [facility] [facility] [phone]                                             Child [phone]    [place]             Adult [phone]                                            Child [phone]      Preventive Care Advice   This is general advice given by our system to help you stay healthy. However, your care team may have specific advice just for you. Please talk to your care team about your preventive care needs.  Nutrition  Eat 5 or more servings of fruits and vegetables each day.  Try wheat bread, brown rice and whole grain pasta (instead of white bread, rice, and pasta).  Get enough calcium and vitamin D. Check the label on foods and aim for 100% of the RDA (recommended daily allowance).  Lifestyle  Exercise at least 150 minutes each week  (30 minutes a day, 5 days a week).  Do muscle strengthening activities 2 days a week. These help control your weight and prevent disease.  No  smoking.  Wear sunscreen to prevent skin cancer.  Have a dental exam and cleaning every 6 months.  Yearly exams  See your health care team every year to talk about:  Any changes in your health.  Any medicines your care team has prescribed.  Preventive care, family planning, and ways to prevent chronic diseases.  Shots (vaccines)   HPV shots (up to age 26), if you've never had them before.  Hepatitis B shots (up to age 59), if you've never had them before.  COVID-19 shot: Get this shot when it's due.  Flu shot: Get a flu shot every year.  Tetanus shot: Get a tetanus shot every 10 years.  Pneumococcal, hepatitis A, and RSV shots: Ask your care team if you need these based on your risk.  Shingles shot (for age 50 and up)  General health tests  Diabetes screening:  Starting at age 35, Get screened for diabetes at least every 3 years.  If you are younger than age 35, ask your care team if you should be screened for diabetes.  Cholesterol test: At age 39, start having a cholesterol test every 5 years, or more often if advised.  Bone density scan (DEXA): At age 50, ask your care team if you should have this scan for osteoporosis (brittle bones).  Hepatitis C: Get tested at least once in your life.  STIs (sexually transmitted infections)  Before age 24: Ask your care team if you should be screened for STIs.  After age 24: Get screened for STIs if you're at risk. You are at risk for STIs (including HIV) if:  You are sexually active with more than one person.  You don't use condoms every time.  You or a partner was diagnosed with a sexually transmitted infection.  If you are at risk for HIV, ask about PrEP medicine to prevent HIV.  Get tested for HIV at least once in your life, whether you are at risk for HIV or not.  Cancer screening tests  Cervical cancer screening: If you have a cervix, begin getting regular cervical cancer screening tests starting at age 21.  Breast cancer scan (mammogram): If you've ever had breasts,  begin having regular mammograms starting at age 40. This is a scan to check for breast cancer.  Colon cancer screening: It is important to start screening for colon cancer at age 45.  Have a colonoscopy test every 10 years (or more often if you're at risk) Or, ask your provider about stool tests like a FIT test every year or Cologuard test every 3 years.  To learn more about your testing options, visit:   .  For help making a decision, visit:   https://bit.ly/jp12752.  Prostate cancer screening test: If you have a prostate, ask your care team if a prostate cancer screening test (PSA) at age 55 is right for you.  Lung cancer screening: If you are a current or former smoker ages 50 to 80, ask your care team if ongoing lung cancer screenings are right for you.  For informational purposes only. Not to replace the advice of your health care provider. Copyright   2023 [employer]. All rights reserved. Clinically reviewed by the [facility] Transitions Program. [employer] [id number] - REV 01/24.  Learning About Stress  What is stress?     Stress is your body's response to a hard situation. Your body can have a physical, emotional, or mental response. Stress is a fact of life for most people, and it affects everyone differently. What causes stress for you may not be stressful for someone else.  A lot of things can cause stress. You may feel stress when you go on a job interview, take a test, or run a race. This kind of short-term stress is normal and even useful. It can help you if you need to work hard or react quickly. For example, stress can help you finish an important job on time.  Long-term stress is caused by ongoing stressful situations or events. Examples of long-term stress include long-term health problems, ongoing problems at work, or conflicts in your family. Long-term stress can harm your health.  How does stress affect your health?  When you are stressed, your body responds as though you are in  danger. It makes hormones that speed up your heart, make you breathe faster, and give you a burst of energy. This is called the fight-or-flight stress response. If the stress is over quickly, your body goes back to normal and no harm is done.  But if stress happens too often or lasts too long, it can have bad effects. Long-term stress can make you more likely to get sick, and it can make symptoms of some diseases worse. If you tense up when you are stressed, you may develop neck, shoulder, or low back pain. Stress is linked to high blood pressure and heart disease.  Stress also harms your emotional health. It can make you morel, tense, or depressed. Your relationships may suffer, and you may not do well at work or school.  What can you do to manage stress?  You can try these things to help manage stress:   Do something active. Exercise or activity can help reduce stress. Walking is a great way to get started. Even everyday activities such as housecleaning or yard work can help.  Try yoga or silvia chi. These techniques combine exercise and meditation. You may need some training at first to learn them.  Do something you enjoy. For example, listen to music or go to a movie. Practice your hobby or do volunteer work.  Meditate. This can help you relax, because you are not worrying about what happened before or what may happen in the future.  Do guided imagery. Imagine yourself in any setting that helps you feel calm. You can use online videos, books, or a teacher to guide you.  Do breathing exercises. For example:  From a standing position, bend forward from the waist with your knees slightly bent. Let your arms dangle close to the floor.  Breathe in slowly and deeply as you return to a standing position. Roll up slowly and lift your head last.  Hold your breath for just a few seconds in the standing position.  Breathe out slowly and bend forward from the waist.  Let your feelings out. Talk, laugh, cry, and express anger  "when you need to. Talking with supportive friends or family, a counselor, or a mahesh leader about your feelings is a healthy way to relieve stress. Avoid discussing your feelings with people who make you feel worse.  Write. It may help to write about things that are bothering you. This helps you find out how much stress you feel and what is causing it. When you know this, you can find better ways to cope.  What can you do to prevent stress?  You might try some of these things to help prevent stress:  Manage your time. This helps you find time to do the things you want and need to do.  Get enough sleep. Your body recovers from the stresses of the day while you are sleeping.  Get support. Your family, friends, and community can make a difference in how you experience stress.  Limit your news feed. Avoid or limit time on social media or news that may make you feel stressed.  Do something active. Exercise or activity can help reduce stress. Walking is a great way to get started.  Where can you learn more?  Go to https://www.[employer].net/patiented  Enter N032 in the search box to learn more about \"Learning About Stress.\"  Current as of: October 24, 2023  Content Version: 14.2 2024 [facility] [employer]   Care instructions adapted under license by your healthcare professional. If you have questions about a medical condition or this instruction, always ask your healthcare professional. [employer] disclaims any warranty or liability for your use of this information.    Learning About Depression Screening  What is depression screening?  Depression screening is a way to see if you have depression symptoms. It may be done by a doctor or counselor. It's often part of a routine checkup. That's because your mental health is just as important as your physical health.  Depression is a mental health condition that affects how you feel, think, and act. You may:  Have less energy.  Lose interest in your daily " "activities.  Feel sad and grouchy for a long time.  Depression is very common. It affects people of all ages.  Many things can lead to depression. Some people become depressed after they have a stroke or find out they have a major illness like cancer or heart disease. The death of a loved one or a breakup may lead to depression. It can run in families. Most experts believe that a combination of inherited genes and stressful life events can cause it.  What happens during screening?  You may be asked to fill out a form about your depression symptoms. You and the doctor will discuss your answers. The doctor may ask you more questions to learn more about how you think, act, and feel.  What happens after screening?  If you have symptoms of depression, your doctor will talk to you about your options.  Doctors usually treat depression with medicines or counseling. Often, combining the two works best. Many people don't get help because they think that they'll get over the depression on their own. But people with depression may not get better unless they get treatment.  The cause of depression is not well understood. There may be many factors involved. But if you have depression, it's not your fault.  A serious symptom of depression is thinking about death or suicide. If you or someone you care about talks about this or about feeling hopeless, get help right away.  It's important to know that depression can be treated. Medicine, counseling, and self-care may help.  Where can you learn more?  Go to https://www.[employer].net/patiented  Enter T185 in the search box to learn more about \"Learning About Depression Screening.\"  Current as of: June 24, 2023  Content Version: 14.2 2024 [facility] [employer].   Care instructions adapted under license by your healthcare professional. If you have questions about a medical condition or this instruction, always ask your healthcare professional. Healthwise[employer] Incorporated disclaims any " warranty or liability for your use of this information.

## 2024-10-26 LAB
ALBUMIN SERPL BCG-MCNC: 4.4 G/DL (ref 3.5–5.2)
ALP SERPL-CCNC: 97 U/L (ref 40–150)
ALT SERPL W P-5'-P-CCNC: 22 U/L (ref 0–70)
ANION GAP SERPL CALCULATED.3IONS-SCNC: 11 MMOL/L (ref 7–15)
AST SERPL W P-5'-P-CCNC: 23 U/L (ref 0–45)
BILIRUB SERPL-MCNC: 0.4 MG/DL
BUN SERPL-MCNC: 11.4 MG/DL (ref 6–20)
CALCIUM SERPL-MCNC: 9.1 MG/DL (ref 8.8–10.4)
CHLORIDE SERPL-SCNC: 104 MMOL/L (ref 98–107)
CHOLEST SERPL-MCNC: 179 MG/DL
CREAT SERPL-MCNC: 0.84 MG/DL (ref 0.67–1.17)
EGFRCR SERPLBLD CKD-EPI 2021: >90 ML/MIN/1.73M2
FASTING STATUS PATIENT QL REPORTED: NO
FASTING STATUS PATIENT QL REPORTED: NO
GLUCOSE SERPL-MCNC: 95 MG/DL (ref 70–99)
HCO3 SERPL-SCNC: 24 MMOL/L (ref 22–29)
HDLC SERPL-MCNC: 36 MG/DL
HIV 1+2 AB+HIV1 P24 AG SERPL QL IA: NONREACTIVE
LDLC SERPL CALC-MCNC: 116 MG/DL
NONHDLC SERPL-MCNC: 143 MG/DL
POTASSIUM SERPL-SCNC: 4 MMOL/L (ref 3.4–5.3)
PROT SERPL-MCNC: 7.5 G/DL (ref 6.4–8.3)
SODIUM SERPL-SCNC: 139 MMOL/L (ref 135–145)
TRIGL SERPL-MCNC: 136 MG/DL
TSH SERPL DL<=0.005 MIU/L-ACNC: 0.83 UIU/ML (ref 0.3–4.2)
VIT D+METAB SERPL-MCNC: 19 NG/ML (ref 20–50)

## 2024-10-28 NOTE — RESULT ENCOUNTER NOTE
Nathan Raman,       Your recent test results are attached, if you have any questions or concerns please feel free to contact me via e-mail or call [phone].  Vitamin D is low at 19. This can cause fatigue. I would recommend taking over the counter vitamin D3 3,000 units daily and rechecking labs in about 3 months. Cholesterol ok for not being fasting. Thyroid hormone normal. Sodium and potassium normal. Blood sugar (glucose) normal.  Creatinine and GFR normal, which means kidney function is normal.     Liver enzymes normal. HIV test negative. A1c shows no diabetes. White and red blood cell counts normal. No anemia.        It was a pleasure to see you at your recent office visit.      Sincerely,  Pastora Celis PA-C

## 2024-10-31 ENCOUNTER — MYC MEDICAL ADVICE (OUTPATIENT)
Dept: FAMILY MEDICINE | Facility: CLINIC | Age: 24
End: 2024-10-31
Payer: COMMERCIAL

## 2024-10-31 DIAGNOSIS — G47.00 INSOMNIA, UNSPECIFIED TYPE: Primary | ICD-10-CM

## 2024-11-01 RX ORDER — HYDROXYZINE HYDROCHLORIDE 25 MG/1
50-100 TABLET, FILM COATED ORAL
Qty: 90 TABLET | Refills: 1 | Status: SHIPPED | OUTPATIENT
Start: 2024-11-01

## 2024-11-04 NOTE — RESULT ENCOUNTER NOTE
Please send letter:  Nathan Raman,       Your recent test results are attached, if you have any questions or concerns please feel free to contact me via e-mail or call [phone].  Vitamin D is low at 19. This can cause fatigue. I would recommend taking over the counter vitamin D3 3,000 units daily and rechecking labs in about 3 months. Cholesterol ok for not being fasting. Thyroid hormone normal. Sodium and potassium normal. Blood sugar (glucose) normal.  Creatinine and GFR normal, which means kidney function is normal.    Liver enzymes normal. HIV test negative. A1c shows no diabetes. White and red blood cell counts normal. No anemia.       It was a pleasure to see you at your recent office visit.      Sincerely,  Pastora Celis PA-C